# Patient Record
Sex: FEMALE | Race: WHITE | HISPANIC OR LATINO | Employment: OTHER | URBAN - METROPOLITAN AREA
[De-identification: names, ages, dates, MRNs, and addresses within clinical notes are randomized per-mention and may not be internally consistent; named-entity substitution may affect disease eponyms.]

---

## 2023-11-03 DIAGNOSIS — I48.91 ATRIAL FIBRILLATION, UNSPECIFIED TYPE (MULTI): Primary | ICD-10-CM

## 2023-11-03 RX ORDER — METOPROLOL SUCCINATE 100 MG/1
100 TABLET, EXTENDED RELEASE ORAL 2 TIMES DAILY
COMMUNITY
End: 2023-11-03 | Stop reason: SDUPTHER

## 2023-11-03 RX ORDER — APIXABAN 2.5 MG/1
2.5 TABLET, FILM COATED ORAL 2 TIMES DAILY
COMMUNITY
End: 2024-02-13 | Stop reason: SDUPTHER

## 2023-11-03 RX ORDER — FUROSEMIDE 20 MG/1
20 TABLET ORAL DAILY
COMMUNITY

## 2023-11-03 RX ORDER — LOSARTAN POTASSIUM 100 MG/1
100 TABLET ORAL DAILY
COMMUNITY

## 2023-11-03 RX ORDER — LYSINE HCL 500 MG
1 TABLET ORAL DAILY
COMMUNITY

## 2023-11-03 RX ORDER — METOPROLOL SUCCINATE 100 MG/1
100 TABLET, EXTENDED RELEASE ORAL 2 TIMES DAILY
Qty: 180 TABLET | Refills: 3 | Status: SHIPPED | OUTPATIENT
Start: 2023-11-03 | End: 2024-11-02

## 2023-11-03 RX ORDER — AMOXICILLIN 500 MG/1
4 TABLET, FILM COATED ORAL ONCE AS NEEDED
COMMUNITY
Start: 2019-10-22 | End: 2024-05-30 | Stop reason: ENTERED-IN-ERROR

## 2023-11-03 RX ORDER — ERYTHROMYCIN 5 MG/G
OINTMENT OPHTHALMIC 3 TIMES DAILY
COMMUNITY
Start: 2023-01-11 | End: 2024-05-30 | Stop reason: ENTERED-IN-ERROR

## 2023-11-03 RX ORDER — DIGOXIN 125 MCG
125 TABLET ORAL
COMMUNITY

## 2023-11-30 DIAGNOSIS — U07.1 COVID: Primary | ICD-10-CM

## 2023-11-30 RX ORDER — NIRMATRELVIR AND RITONAVIR 300-100 MG
3 KIT ORAL 2 TIMES DAILY
Qty: 30 TABLET | Refills: 0 | Status: CANCELLED | OUTPATIENT
Start: 2023-11-30 | End: 2023-12-05

## 2024-02-13 ENCOUNTER — TELEPHONE (OUTPATIENT)
Dept: ENDOCRINOLOGY | Facility: CLINIC | Age: 89
End: 2024-02-13
Payer: MEDICARE

## 2024-02-13 DIAGNOSIS — I48.91 ATRIAL FIBRILLATION, UNSPECIFIED TYPE (MULTI): ICD-10-CM

## 2024-02-13 DIAGNOSIS — E78.5 HYPERLIPIDEMIA, UNSPECIFIED HYPERLIPIDEMIA TYPE: ICD-10-CM

## 2024-02-13 RX ORDER — SIMVASTATIN 20 MG/1
20 TABLET, FILM COATED ORAL NIGHTLY
COMMUNITY
End: 2024-02-13 | Stop reason: SDUPTHER

## 2024-02-13 RX ORDER — APIXABAN 2.5 MG/1
2.5 TABLET, FILM COATED ORAL 2 TIMES DAILY
Qty: 90 TABLET | Refills: 2 | Status: SHIPPED | OUTPATIENT
Start: 2024-02-13

## 2024-02-13 RX ORDER — SIMVASTATIN 20 MG/1
20 TABLET, FILM COATED ORAL NIGHTLY
Qty: 90 TABLET | Refills: 2 | Status: SHIPPED | OUTPATIENT
Start: 2024-02-13

## 2024-02-13 NOTE — TELEPHONE ENCOUNTER
Patient's son in law called stating that she needs a refill of Eliquis and Simvastatin sent to [facility].

## 2024-03-26 DIAGNOSIS — R35.0 URINARY FREQUENCY: Primary | ICD-10-CM

## 2024-03-26 RX ORDER — OXYBUTYNIN CHLORIDE 5 MG/1
TABLET, EXTENDED RELEASE ORAL
Qty: 180 TABLET | Refills: 3 | Status: SHIPPED | OUTPATIENT
Start: 2024-03-26

## 2024-03-26 RX ORDER — OXYBUTYNIN CHLORIDE 5 MG/1
TABLET, EXTENDED RELEASE ORAL
COMMUNITY
End: 2024-03-26 | Stop reason: SDUPTHER

## 2024-04-12 ENCOUNTER — TELEPHONE (OUTPATIENT)
Dept: ENDOCRINOLOGY | Facility: CLINIC | Age: 89
End: 2024-04-12
Payer: MEDICARE

## 2024-04-12 NOTE — TELEPHONE ENCOUNTER
Son in law called called to get refill of eliquis. Would also like to speak with someone about his mother in law's meds as he has been having problems with getting them refilled.

## 2024-05-30 ENCOUNTER — APPOINTMENT (OUTPATIENT)
Dept: CARDIOLOGY | Facility: HOSPITAL | Age: 89
DRG: 193 | End: 2024-05-30
Payer: MEDICARE

## 2024-05-30 ENCOUNTER — TELEPHONE (OUTPATIENT)
Dept: ENDOCRINOLOGY | Facility: CLINIC | Age: 89
End: 2024-05-30
Payer: MEDICARE

## 2024-05-30 ENCOUNTER — APPOINTMENT (OUTPATIENT)
Dept: RADIOLOGY | Facility: HOSPITAL | Age: 89
DRG: 193 | End: 2024-05-30
Payer: MEDICARE

## 2024-05-30 ENCOUNTER — HOSPITAL ENCOUNTER (INPATIENT)
Facility: HOSPITAL | Age: 89
LOS: 4 days | Discharge: NURSING, REHAB OR LONG-TERM CARE | DRG: 193 | End: 2024-06-03
Attending: EMERGENCY MEDICINE | Admitting: INTERNAL MEDICINE
Payer: MEDICARE

## 2024-05-30 DIAGNOSIS — I48.91 ATRIAL FIBRILLATION WITH RVR (MULTI): ICD-10-CM

## 2024-05-30 DIAGNOSIS — J18.9 PNEUMONIA OF LOWER LOBE DUE TO INFECTIOUS ORGANISM, UNSPECIFIED LATERALITY: Primary | ICD-10-CM

## 2024-05-30 DIAGNOSIS — I50.9 CONGESTIVE HEART FAILURE, UNSPECIFIED HF CHRONICITY, UNSPECIFIED HEART FAILURE TYPE (MULTI): ICD-10-CM

## 2024-05-30 LAB
ALBUMIN SERPL BCP-MCNC: 4.1 G/DL (ref 3.4–5)
ALP SERPL-CCNC: 37 U/L (ref 33–136)
ALT SERPL W P-5'-P-CCNC: 14 U/L (ref 7–45)
ANION GAP BLDV CALCULATED.4IONS-SCNC: 13 MMOL/L (ref 10–25)
ANION GAP SERPL CALC-SCNC: 18 MMOL/L (ref 10–20)
AST SERPL W P-5'-P-CCNC: 56 U/L (ref 9–39)
BASE EXCESS BLDV CALC-SCNC: 2.5 MMOL/L (ref -2–3)
BASOPHILS # BLD AUTO: 0.03 X10*3/UL (ref 0–0.1)
BASOPHILS NFR BLD AUTO: 0.2 %
BILIRUB DIRECT SERPL-MCNC: 0.2 MG/DL (ref 0–0.3)
BILIRUB SERPL-MCNC: 1 MG/DL (ref 0–1.2)
BNP SERPL-MCNC: 826 PG/ML (ref 0–99)
BODY TEMPERATURE: 37 DEGREES CELSIUS
BUN SERPL-MCNC: 38 MG/DL (ref 6–23)
CA-I BLDV-SCNC: 1.02 MMOL/L (ref 1.1–1.33)
CALCIUM SERPL-MCNC: 8.6 MG/DL (ref 8.6–10.3)
CHLORIDE BLDV-SCNC: 97 MMOL/L (ref 98–107)
CHLORIDE SERPL-SCNC: 95 MMOL/L (ref 98–107)
CO2 SERPL-SCNC: 22 MMOL/L (ref 21–32)
CREAT SERPL-MCNC: 1.82 MG/DL (ref 0.5–1.05)
DIGOXIN SERPL-MCNC: 1.79 NG/ML (ref 0.8–?)
EGFRCR SERPLBLD CKD-EPI 2021: 25 ML/MIN/1.73M*2
EOSINOPHIL # BLD AUTO: 0 X10*3/UL (ref 0–0.4)
EOSINOPHIL NFR BLD AUTO: 0 %
ERYTHROCYTE [DISTWIDTH] IN BLOOD BY AUTOMATED COUNT: 13.7 % (ref 11.5–14.5)
GLUCOSE BLDV-MCNC: 126 MG/DL (ref 74–99)
GLUCOSE SERPL-MCNC: 114 MG/DL (ref 74–99)
HCO3 BLDV-SCNC: 26.3 MMOL/L (ref 22–26)
HCT VFR BLD AUTO: 41.1 % (ref 36–46)
HCT VFR BLD EST: 43 % (ref 36–46)
HGB BLD-MCNC: 13.4 G/DL (ref 12–16)
HGB BLDV-MCNC: 14.2 G/DL (ref 12–16)
IMM GRANULOCYTES # BLD AUTO: 0.08 X10*3/UL (ref 0–0.5)
IMM GRANULOCYTES NFR BLD AUTO: 0.6 % (ref 0–0.9)
INHALED O2 CONCENTRATION: 21 %
LACTATE BLDV-SCNC: 2.6 MMOL/L (ref 0.4–2)
LACTATE BLDV-SCNC: 2.8 MMOL/L (ref 0.4–2)
LYMPHOCYTES # BLD AUTO: 0.8 X10*3/UL (ref 0.8–3)
LYMPHOCYTES NFR BLD AUTO: 5.8 %
MCH RBC QN AUTO: 31.4 PG (ref 26–34)
MCHC RBC AUTO-ENTMCNC: 32.6 G/DL (ref 32–36)
MCV RBC AUTO: 96 FL (ref 80–100)
MONOCYTES # BLD AUTO: 0.68 X10*3/UL (ref 0.05–0.8)
MONOCYTES NFR BLD AUTO: 4.9 %
NEUTROPHILS # BLD AUTO: 12.26 X10*3/UL (ref 1.6–5.5)
NEUTROPHILS NFR BLD AUTO: 88.5 %
NRBC BLD-RTO: 0 /100 WBCS (ref 0–0)
OXYHGB MFR BLDV: 14.1 % (ref 45–75)
PCO2 BLDV: 37 MM HG (ref 41–51)
PH BLDV: 7.46 PH (ref 7.33–7.43)
PLATELET # BLD AUTO: 112 X10*3/UL (ref 150–450)
PO2 BLDV: 15 MM HG (ref 35–45)
POTASSIUM BLDV-SCNC: 4.5 MMOL/L (ref 3.5–5.3)
POTASSIUM SERPL-SCNC: 4.3 MMOL/L (ref 3.5–5.3)
PROT SERPL-MCNC: 8.3 G/DL (ref 6.4–8.2)
RBC # BLD AUTO: 4.27 X10*6/UL (ref 4–5.2)
SAO2 % BLDV: 14 % (ref 45–75)
SARS-COV-2 RNA RESP QL NAA+PROBE: NOT DETECTED
SODIUM BLDV-SCNC: 132 MMOL/L (ref 136–145)
SODIUM SERPL-SCNC: 131 MMOL/L (ref 136–145)
WBC # BLD AUTO: 13.9 X10*3/UL (ref 4.4–11.3)

## 2024-05-30 PROCEDURE — 96365 THER/PROPH/DIAG IV INF INIT: CPT | Mod: 59

## 2024-05-30 PROCEDURE — 9420000001 HC RT PATIENT EDUCATION 5 MIN

## 2024-05-30 PROCEDURE — 71045 X-RAY EXAM CHEST 1 VIEW: CPT

## 2024-05-30 PROCEDURE — 82248 BILIRUBIN DIRECT: CPT | Performed by: EMERGENCY MEDICINE

## 2024-05-30 PROCEDURE — 94640 AIRWAY INHALATION TREATMENT: CPT | Mod: MUE

## 2024-05-30 PROCEDURE — 36415 COLL VENOUS BLD VENIPUNCTURE: CPT | Performed by: EMERGENCY MEDICINE

## 2024-05-30 PROCEDURE — 84132 ASSAY OF SERUM POTASSIUM: CPT | Mod: 91 | Performed by: EMERGENCY MEDICINE

## 2024-05-30 PROCEDURE — 71045 X-RAY EXAM CHEST 1 VIEW: CPT | Mod: FOREIGN READ | Performed by: RADIOLOGY

## 2024-05-30 PROCEDURE — 94640 AIRWAY INHALATION TREATMENT: CPT

## 2024-05-30 PROCEDURE — 93005 ELECTROCARDIOGRAM TRACING: CPT

## 2024-05-30 PROCEDURE — 93010 ELECTROCARDIOGRAM REPORT: CPT | Performed by: INTERNAL MEDICINE

## 2024-05-30 PROCEDURE — 2500000002 HC RX 250 W HCPCS SELF ADMINISTERED DRUGS (ALT 637 FOR MEDICARE OP, ALT 636 FOR OP/ED): Performed by: EMERGENCY MEDICINE

## 2024-05-30 PROCEDURE — 87635 SARS-COV-2 COVID-19 AMP PRB: CPT | Performed by: EMERGENCY MEDICINE

## 2024-05-30 PROCEDURE — 96367 TX/PROPH/DG ADDL SEQ IV INF: CPT

## 2024-05-30 PROCEDURE — 87075 CULTR BACTERIA EXCEPT BLOOD: CPT | Mod: 91,AHULAB | Performed by: EMERGENCY MEDICINE

## 2024-05-30 PROCEDURE — 2500000004 HC RX 250 GENERAL PHARMACY W/ HCPCS (ALT 636 FOR OP/ED): Performed by: EMERGENCY MEDICINE

## 2024-05-30 PROCEDURE — 83605 ASSAY OF LACTIC ACID: CPT | Performed by: EMERGENCY MEDICINE

## 2024-05-30 PROCEDURE — 94664 DEMO&/EVAL PT USE INHALER: CPT

## 2024-05-30 PROCEDURE — 2500000001 HC RX 250 WO HCPCS SELF ADMINISTERED DRUGS (ALT 637 FOR MEDICARE OP): Performed by: NURSE PRACTITIONER

## 2024-05-30 PROCEDURE — 1200000002 HC GENERAL ROOM WITH TELEMETRY DAILY

## 2024-05-30 PROCEDURE — 96361 HYDRATE IV INFUSION ADD-ON: CPT

## 2024-05-30 PROCEDURE — 83880 ASSAY OF NATRIURETIC PEPTIDE: CPT | Performed by: EMERGENCY MEDICINE

## 2024-05-30 PROCEDURE — 85025 COMPLETE CBC W/AUTO DIFF WBC: CPT | Performed by: EMERGENCY MEDICINE

## 2024-05-30 PROCEDURE — 99291 CRITICAL CARE FIRST HOUR: CPT | Performed by: EMERGENCY MEDICINE

## 2024-05-30 PROCEDURE — 87040 BLOOD CULTURE FOR BACTERIA: CPT | Mod: 91,AHULAB | Performed by: EMERGENCY MEDICINE

## 2024-05-30 PROCEDURE — 2500000005 HC RX 250 GENERAL PHARMACY W/O HCPCS: Performed by: NURSE PRACTITIONER

## 2024-05-30 PROCEDURE — 99223 1ST HOSP IP/OBS HIGH 75: CPT | Performed by: NURSE PRACTITIONER

## 2024-05-30 PROCEDURE — 80162 ASSAY OF DIGOXIN TOTAL: CPT | Performed by: PHARMACIST

## 2024-05-30 PROCEDURE — 2500000005 HC RX 250 GENERAL PHARMACY W/O HCPCS: Performed by: EMERGENCY MEDICINE

## 2024-05-30 PROCEDURE — 82435 ASSAY OF BLOOD CHLORIDE: CPT | Performed by: EMERGENCY MEDICINE

## 2024-05-30 PROCEDURE — 96375 TX/PRO/DX INJ NEW DRUG ADDON: CPT

## 2024-05-30 PROCEDURE — 2500000002 HC RX 250 W HCPCS SELF ADMINISTERED DRUGS (ALT 637 FOR MEDICARE OP, ALT 636 FOR OP/ED): Performed by: NURSE PRACTITIONER

## 2024-05-30 RX ORDER — OXYBUTYNIN CHLORIDE 5 MG/1
5 TABLET ORAL 2 TIMES DAILY PRN
Status: DISCONTINUED | OUTPATIENT
Start: 2024-05-30 | End: 2024-06-03 | Stop reason: HOSPADM

## 2024-05-30 RX ORDER — METOPROLOL TARTRATE 1 MG/ML
5 INJECTION, SOLUTION INTRAVENOUS ONCE
Status: COMPLETED | OUTPATIENT
Start: 2024-05-30 | End: 2024-05-30

## 2024-05-30 RX ORDER — MIRTAZAPINE 15 MG/1
7.5 TABLET, FILM COATED ORAL NIGHTLY
Status: DISCONTINUED | OUTPATIENT
Start: 2024-05-30 | End: 2024-06-03 | Stop reason: HOSPADM

## 2024-05-30 RX ORDER — IPRATROPIUM BROMIDE AND ALBUTEROL SULFATE 2.5; .5 MG/3ML; MG/3ML
3 SOLUTION RESPIRATORY (INHALATION) EVERY 6 HOURS PRN
Status: DISCONTINUED | OUTPATIENT
Start: 2024-05-30 | End: 2024-05-30

## 2024-05-30 RX ORDER — METOPROLOL SUCCINATE 50 MG/1
100 TABLET, EXTENDED RELEASE ORAL 2 TIMES DAILY
Status: DISCONTINUED | OUTPATIENT
Start: 2024-05-30 | End: 2024-06-02

## 2024-05-30 RX ORDER — POTASSIUM CHLORIDE 20 MEQ/1
20 TABLET, EXTENDED RELEASE ORAL DAILY
COMMUNITY

## 2024-05-30 RX ORDER — PRAMIPEXOLE DIHYDROCHLORIDE 0.25 MG/1
0.12 TABLET ORAL NIGHTLY
Status: DISCONTINUED | OUTPATIENT
Start: 2024-05-30 | End: 2024-06-03 | Stop reason: HOSPADM

## 2024-05-30 RX ORDER — METOPROLOL TARTRATE 1 MG/ML
2.5 INJECTION, SOLUTION INTRAVENOUS ONCE
Status: COMPLETED | OUTPATIENT
Start: 2024-05-30 | End: 2024-05-30

## 2024-05-30 RX ORDER — FUROSEMIDE 20 MG/1
20 TABLET ORAL DAILY
Status: DISCONTINUED | OUTPATIENT
Start: 2024-05-30 | End: 2024-06-03 | Stop reason: HOSPADM

## 2024-05-30 RX ORDER — IPRATROPIUM BROMIDE AND ALBUTEROL SULFATE 2.5; .5 MG/3ML; MG/3ML
3 SOLUTION RESPIRATORY (INHALATION) ONCE
Status: COMPLETED | OUTPATIENT
Start: 2024-05-30 | End: 2024-05-30

## 2024-05-30 RX ORDER — PRAMIPEXOLE DIHYDROCHLORIDE 0.12 MG/1
0.12 TABLET ORAL NIGHTLY
COMMUNITY

## 2024-05-30 RX ORDER — LOSARTAN POTASSIUM 50 MG/1
100 TABLET ORAL DAILY
Status: DISCONTINUED | OUTPATIENT
Start: 2024-05-30 | End: 2024-06-03 | Stop reason: HOSPADM

## 2024-05-30 RX ORDER — SIMVASTATIN 20 MG/1
20 TABLET, FILM COATED ORAL NIGHTLY
Status: DISCONTINUED | OUTPATIENT
Start: 2024-05-30 | End: 2024-06-02

## 2024-05-30 RX ORDER — ONDANSETRON HYDROCHLORIDE 2 MG/ML
4 INJECTION, SOLUTION INTRAVENOUS EVERY 6 HOURS PRN
Status: DISCONTINUED | OUTPATIENT
Start: 2024-05-30 | End: 2024-06-03 | Stop reason: HOSPADM

## 2024-05-30 RX ORDER — MIRTAZAPINE 7.5 MG/1
7.5 TABLET, FILM COATED ORAL NIGHTLY
COMMUNITY

## 2024-05-30 RX ORDER — IPRATROPIUM BROMIDE AND ALBUTEROL SULFATE 2.5; .5 MG/3ML; MG/3ML
3 SOLUTION RESPIRATORY (INHALATION) EVERY 2 HOUR PRN
Status: DISCONTINUED | OUTPATIENT
Start: 2024-05-30 | End: 2024-06-03 | Stop reason: HOSPADM

## 2024-05-30 RX ORDER — ACETAMINOPHEN 325 MG/1
650 TABLET ORAL EVERY 4 HOURS PRN
Status: DISCONTINUED | OUTPATIENT
Start: 2024-05-30 | End: 2024-06-03 | Stop reason: HOSPADM

## 2024-05-30 RX ORDER — DIGOXIN 125 MCG
125 TABLET ORAL
Status: DISCONTINUED | OUTPATIENT
Start: 2024-05-31 | End: 2024-06-03 | Stop reason: HOSPADM

## 2024-05-30 RX ORDER — IPRATROPIUM BROMIDE AND ALBUTEROL SULFATE 2.5; .5 MG/3ML; MG/3ML
3 SOLUTION RESPIRATORY (INHALATION)
Status: DISCONTINUED | OUTPATIENT
Start: 2024-05-30 | End: 2024-06-03 | Stop reason: HOSPADM

## 2024-05-30 RX ADMIN — METOPROLOL TARTRATE 2.5 MG: 5 INJECTION INTRAVENOUS at 23:08

## 2024-05-30 RX ADMIN — PRAMIPEXOLE DIHYDROCHLORIDE 0.12 MG: 0.25 TABLET ORAL at 20:43

## 2024-05-30 RX ADMIN — CEFTRIAXONE 2 G: 2 INJECTION, POWDER, FOR SOLUTION INTRAMUSCULAR; INTRAVENOUS at 14:16

## 2024-05-30 RX ADMIN — SODIUM CHLORIDE, POTASSIUM CHLORIDE, SODIUM LACTATE AND CALCIUM CHLORIDE 500 ML: 600; 310; 30; 20 INJECTION, SOLUTION INTRAVENOUS at 13:19

## 2024-05-30 RX ADMIN — MIRTAZAPINE 7.5 MG: 15 TABLET, FILM COATED ORAL at 20:43

## 2024-05-30 RX ADMIN — APIXABAN 2.5 MG: 2.5 TABLET, FILM COATED ORAL at 20:43

## 2024-05-30 RX ADMIN — IPRATROPIUM BROMIDE AND ALBUTEROL SULFATE 3 ML: 2.5; .5 SOLUTION RESPIRATORY (INHALATION) at 20:56

## 2024-05-30 RX ADMIN — AZITHROMYCIN MONOHYDRATE 500 MG: 500 INJECTION, POWDER, LYOPHILIZED, FOR SOLUTION INTRAVENOUS at 14:52

## 2024-05-30 RX ADMIN — METOPROLOL SUCCINATE 100 MG: 50 TABLET, EXTENDED RELEASE ORAL at 20:42

## 2024-05-30 RX ADMIN — SIMVASTATIN 20 MG: 20 TABLET, FILM COATED ORAL at 20:43

## 2024-05-30 RX ADMIN — METOPROLOL TARTRATE 5 MG: 5 INJECTION INTRAVENOUS at 14:16

## 2024-05-30 RX ADMIN — IPRATROPIUM BROMIDE AND ALBUTEROL SULFATE 3 ML: 2.5; .5 SOLUTION RESPIRATORY (INHALATION) at 13:21

## 2024-05-30 SDOH — SOCIAL STABILITY: SOCIAL INSECURITY: DO YOU FEEL ANYONE HAS EXPLOITED OR TAKEN ADVANTAGE OF YOU FINANCIALLY OR OF YOUR PERSONAL PROPERTY?: NO

## 2024-05-30 SDOH — SOCIAL STABILITY: SOCIAL INSECURITY: DO YOU FEEL UNSAFE GOING BACK TO THE PLACE WHERE YOU ARE LIVING?: NO

## 2024-05-30 SDOH — ECONOMIC STABILITY: INCOME INSECURITY: HOW HARD IS IT FOR YOU TO PAY FOR THE VERY BASICS LIKE FOOD, HOUSING, MEDICAL CARE, AND HEATING?: PATIENT DECLINED

## 2024-05-30 SDOH — SOCIAL STABILITY: SOCIAL INSECURITY: ABUSE: ADULT

## 2024-05-30 SDOH — SOCIAL STABILITY: SOCIAL INSECURITY: ARE THERE ANY APPARENT SIGNS OF INJURIES/BEHAVIORS THAT COULD BE RELATED TO ABUSE/NEGLECT?: NO

## 2024-05-30 SDOH — SOCIAL STABILITY: SOCIAL INSECURITY: HAS ANYONE EVER THREATENED TO HURT YOUR FAMILY OR YOUR PETS?: NO

## 2024-05-30 SDOH — SOCIAL STABILITY: SOCIAL INSECURITY: HAVE YOU HAD ANY THOUGHTS OF HARMING ANYONE ELSE?: NO

## 2024-05-30 SDOH — SOCIAL STABILITY: SOCIAL INSECURITY: HAVE YOU HAD THOUGHTS OF HARMING ANYONE ELSE?: NO

## 2024-05-30 SDOH — SOCIAL STABILITY: SOCIAL INSECURITY: WERE YOU ABLE TO COMPLETE ALL THE BEHAVIORAL HEALTH SCREENINGS?: YES

## 2024-05-30 SDOH — SOCIAL STABILITY: SOCIAL INSECURITY: DOES ANYONE TRY TO KEEP YOU FROM HAVING/CONTACTING OTHER FRIENDS OR DOING THINGS OUTSIDE YOUR HOME?: NO

## 2024-05-30 SDOH — SOCIAL STABILITY: SOCIAL INSECURITY: ARE YOU OR HAVE YOU BEEN THREATENED OR ABUSED PHYSICALLY, EMOTIONALLY, OR SEXUALLY BY ANYONE?: NO

## 2024-05-30 ASSESSMENT — ACTIVITIES OF DAILY LIVING (ADL)
GROOMING: DEPENDENT
BATHING: DEPENDENT
DRESSING YOURSELF: DEPENDENT
ADEQUATE_TO_COMPLETE_ADL: YES
WALKS IN HOME: DEPENDENT
FEEDING YOURSELF: DEPENDENT
JUDGMENT_ADEQUATE_SAFELY_COMPLETE_DAILY_ACTIVITIES: YES
ASSISTIVE_DEVICE: WALKER
HEARING - RIGHT EAR: DIFFICULTY WITH NOISE
PATIENT'S MEMORY ADEQUATE TO SAFELY COMPLETE DAILY ACTIVITIES?: NO
TOILETING: DEPENDENT
HEARING - LEFT EAR: DIFFICULTY WITH NOISE
LACK_OF_TRANSPORTATION: PATIENT DECLINED

## 2024-05-30 ASSESSMENT — COGNITIVE AND FUNCTIONAL STATUS - GENERAL
WALKING IN HOSPITAL ROOM: A LOT
EATING MEALS: A LITTLE
DRESSING REGULAR UPPER BODY CLOTHING: A LOT
PATIENT BASELINE BEDBOUND: NO
WALKING IN HOSPITAL ROOM: A LOT
CLIMB 3 TO 5 STEPS WITH RAILING: TOTAL
TOILETING: A LOT
HELP NEEDED FOR BATHING: A LOT
MOVING TO AND FROM BED TO CHAIR: A LOT
MOVING TO AND FROM BED TO CHAIR: A LOT
MOBILITY SCORE: 11
DRESSING REGULAR UPPER BODY CLOTHING: A LOT
DRESSING REGULAR LOWER BODY CLOTHING: A LOT
DAILY ACTIVITIY SCORE: 13
TURNING FROM BACK TO SIDE WHILE IN FLAT BAD: A LOT
HELP NEEDED FOR BATHING: A LOT
TOILETING: A LOT
DRESSING REGULAR LOWER BODY CLOTHING: A LOT
MOBILITY SCORE: 11
DAILY ACTIVITIY SCORE: 13
PERSONAL GROOMING: A LOT
CLIMB 3 TO 5 STEPS WITH RAILING: TOTAL
PERSONAL GROOMING: A LOT
STANDING UP FROM CHAIR USING ARMS: A LOT
MOVING FROM LYING ON BACK TO SITTING ON SIDE OF FLAT BED WITH BEDRAILS: A LOT
TURNING FROM BACK TO SIDE WHILE IN FLAT BAD: A LOT
STANDING UP FROM CHAIR USING ARMS: A LOT
MOVING FROM LYING ON BACK TO SITTING ON SIDE OF FLAT BED WITH BEDRAILS: A LOT
EATING MEALS: A LITTLE

## 2024-05-30 ASSESSMENT — LIFESTYLE VARIABLES
HOW OFTEN DO YOU HAVE A DRINK CONTAINING ALCOHOL: NEVER
AUDIT-C TOTAL SCORE: 0
HOW OFTEN DO YOU HAVE 6 OR MORE DRINKS ON ONE OCCASION: NEVER
SKIP TO QUESTIONS 9-10: 1
AUDIT-C TOTAL SCORE: 0
HOW MANY STANDARD DRINKS CONTAINING ALCOHOL DO YOU HAVE ON A TYPICAL DAY: PATIENT DOES NOT DRINK

## 2024-05-30 ASSESSMENT — ENCOUNTER SYMPTOMS
DIARRHEA: 1
RESPIRATORY NEGATIVE: 1
NEUROLOGICAL NEGATIVE: 1
PSYCHIATRIC NEGATIVE: 1
CARDIOVASCULAR NEGATIVE: 1
APPETITE CHANGE: 1
MUSCULOSKELETAL NEGATIVE: 1

## 2024-05-30 ASSESSMENT — PAIN - FUNCTIONAL ASSESSMENT
PAIN_FUNCTIONAL_ASSESSMENT: 0-10
PAIN_FUNCTIONAL_ASSESSMENT: 0-10

## 2024-05-30 ASSESSMENT — COLUMBIA-SUICIDE SEVERITY RATING SCALE - C-SSRS
1. IN THE PAST MONTH, HAVE YOU WISHED YOU WERE DEAD OR WISHED YOU COULD GO TO SLEEP AND NOT WAKE UP?: NO
2. HAVE YOU ACTUALLY HAD ANY THOUGHTS OF KILLING YOURSELF?: NO
6. HAVE YOU EVER DONE ANYTHING, STARTED TO DO ANYTHING, OR PREPARED TO DO ANYTHING TO END YOUR LIFE?: NO

## 2024-05-30 ASSESSMENT — PATIENT HEALTH QUESTIONNAIRE - PHQ9
SUM OF ALL RESPONSES TO PHQ9 QUESTIONS 1 & 2: 0
2. FEELING DOWN, DEPRESSED OR HOPELESS: NOT AT ALL
1. LITTLE INTEREST OR PLEASURE IN DOING THINGS: NOT AT ALL

## 2024-05-30 ASSESSMENT — PAIN SCALES - GENERAL
PAINLEVEL_OUTOF10: 0 - NO PAIN
PAINLEVEL_OUTOF10: 0 - NO PAIN

## 2024-05-30 NOTE — TELEPHONE ENCOUNTER
Son in law called concerned due to his mother in law having a bad cough for a couple of days. She also has had some diarrhea and recently her cough sounds like she has mucus or fluid in her lungs. Son in law would like to know what he should do, not sure if he should take her to urgent care or if Dr. Lewis can prescribe some antibiotics.

## 2024-05-30 NOTE — PROGRESS NOTES
Pharmacy Medication History Review     Spoke to the patients son in law, (the one who does all her meds) patient takes Metoprolol twice a day, and takes Eliquis daily.  Takes Digoxin she only takes 5 days a week.  Took morning meds.    Constance Apodaca is a 89 y.o. female admitted for Pneumonia of lower lobe due to infectious organism, unspecified laterality. Pharmacy reviewed the patient's [id number] medications and allergies for accuracy.    The list below reflectives the updated PTA list. Please review each medication in order reconciliation for additional clarification and justification.  Prior to Admission Medications   Prescriptions Last Dose Informant   Eliquis 2.5 mg tablet 5/30/2024 at AM    Sig: Take 1 tablet (2.5 mg) by mouth 2 times a day.   calcium carbonate-vit D3-min 600 mg calcium- 400 unit tablet 5/29/2024    Sig: Take 1 tablet by mouth once daily.   digoxin (Lanoxin) 125 MCG tablet 5/30/2024 at AM    Sig: Take 1 tablet (125 mcg) by mouth 5 times a week.  TAKE 1 TABLET BY MOUTH EVERY MONDAY, TUESDAY, THURSDAY, FRIDAY, AND SATURDAY   furosemide (Lasix) 20 mg tablet 5/30/2024 at AM    Sig: Take 1 tablet (20 mg) by mouth once daily. TAKE 1 TABLET BY MOUTH DAILY   losartan (Cozaar) 100 mg tablet 5/30/2024 at am    Sig: Take 1 tablet (100 mg) by mouth once daily.  TAKE 1 TABLET BY MOUTH DAILY   metoprolol succinate XL (Toprol-XL) 100 mg 24 hr tablet 5/30/2024 at am    Sig: Take 1 tablet (100 mg) by mouth 2 times a day. TAKE 1 TABLET BY MOUTH TWICE DAILY   mirtazapine (Remeron) 7.5 mg tablet 5/28/2024 at pm    Sig: Take 1 tablet (7.5 mg) by mouth once daily at bedtime.   oxybutynin XL (Ditropan-XL) 5 mg 24 hr tablet 5/30/2024 at am    Sig: TAKE 1 TABLET IN THE MORNING AND 1 IN THE EVENING as needed for urinary frequency.  Causes dry mouth   potassium chloride CR 20 mEq ER tablet 5/30/2024 at am    Sig: Take 1 tablet (20 mEq) by mouth once daily. Do not crush or chew.   pramipexole (Mirapex) 0.125 mg  tablet 5/28/2024    Sig: Take 1 tablet (0.125 mg) by mouth once daily at bedtime. Restless legs   simvastatin (Zocor) 20 mg tablet 5/28/2024 at pm    Sig: Take 1 tablet (20 mg) by mouth once daily at bedtime.      Facility-Administered Medications: None       The list below reflectives the updated allergy list. Please review each documented allergy for additional clarification and justification.  Allergies  Reviewed by Mckenna Izquierdo RN on 5/30/2024   No Known Allergies         Below are additional concerns with the patient's PTA list.      Tahira Gracia

## 2024-05-30 NOTE — ED PROVIDER NOTES
HPI   Chief Complaint   Patient presents with    Cough    Diarrhea       HPI: []  89-year-old white female history of hypertension, dyslipidemia, CHF, A-fib on Eliquis mild dementia comes in with increasing cough and shortness of breath.  Son states that they just came back from [place] they developed URI, and in the mom called the continued URI over the last few days.  Over the last few days she has had increasing cough and shortness of breath had some diarrhea earlier which is since resolved.  Over the last 24 hours she had increasing shortness of breath and cough.  Cough nonproductive.  No fever no chills.  No hematemesis melena hematochezia no hemoptysis no recent travel hospitalization or antibiotic use.  No trauma no falls.  Has generalized weakness.  And fatigue.    Past history: Hypertension, CHF, A-fib, dementia  Social: Patient denies current tobacco alcohol drug abuse.  REVIEW OF SYSTEMS:    GENERAL.: No weight loss, positive for fatigue, anorexia, insomnia, fever.    EYES: No vision loss, double vision, drainage, eye pain.    ENT: No pharyngitis, dry mouth.    CARDIOPULMONARY: No chest pain, palpitations, syncope, near syncope.  Positive for shortness of breath, positive for cough, hemoptysis.    GI: No abdominal pain, change in bowel habits, melena, hematemesis, hematochezia, nausea, vomiting, diarrhea.    : No discharge, dysuria, frequency, urgency, hematuria.    MS: No limb pain, joint pain, joint swelling.    SKIN: No rashes.    PSYCH: No depression, anxiety, suicidality, homicidality.    Review of systems is otherwise negative unless stated above or in history of present illness.  Social history, family history, allergies reviewed.  PHYSICAL EXAM:    GENERAL: Vitals noted, no distress. Alert and oriented  x 1-2 at baseline very frail. Non-toxic.      EENT: TMs clear. Posterior oropharynx unremarkable. No meningismus. No LAD.     NECK: Supple. Nontender. No midline tenderness.     CARDIAC:  Last OV  9/18/2020  Future OV  None scheduled Tachycardic with an irregularly irregular rate and rhythm, 2 x 6 ejection systolic murmur best heard at the left sternal border, no rubs or gallops. No JVD    PULMONARY: Coarse bibasilar crackles with some audible rhonchi. No wheezes  No respiratory distress.  No tachypnea stridor or retractions    ABDOMEN: Soft, nonsurgical. Nontender. No peritoneal signs. Normoactive bowel sounds. No pulsatile masses.     EXTREMITIES: No peripheral edema. Negative Homans bilaterally, no cords.  2+ bounding pulses well-perfused.    SKIN: No rash. Intact.     NEURO: No focal neurologic deficits, NIH score of 0. Cranial nerves normal as tested from II through XII.     MEDICAL DECISION MAKING:  EKG upon arrival on my interpretation shows sinus tachycardia normal axis rate 110 with no acute ischemic change.  Repeat EKG on my interpretation shows atrial fibrillation normal axis rate in the 150s with nonspecific ST-T wave changes.  CBC with shows leukocytosis of 14,000 with a left shift, chemistries unremarkable LFTs unremarkable  elevated chest x-ray shows evolving pneumonia, COVID is negative    Treatment in ED: IV established pancultured given IV Rocephin and azithromycin IV fluids and IV metoprolol 1 dose 5 mg    ED course: Repeat assessment feeling much better workup wheezing improved heart rate down to close to 100    Impression: #1 community-acquired pneumonia, #2 atrial fibrillation with RVR    Plans and MDM: 89-year-old female history of A-fib on Eliquis presents with a cough congestion and shortness of breath workup is concerning for evolving pneumonia perhaps mild decompensated CHF with A-fib RVR rate well-controlled with 1 dose of IV metoprolol patient already on Eliquis no concern for septic shock pulmonary edema STEMI NSTEMI ACS or dissection, patient will be hospitalized for further care.                          [place] Coma Scale Score: 15                     Patient History   Past Medical History:   Diagnosis  Date    Atherosclerotic heart disease of native coronary artery without angina pectoris 10/12/2017    Coronary sclerosis    Chronic systolic (congestive) heart failure (Multi) 04/18/2022    Chronic systolic heart failure, ACC/AHA stage C    Disorder of lacrimal system, unspecified 11/08/2018    Defect of lacrimal duct    Encounter for immunization 10/08/2020    Influenza vaccine administered    Fracture of unspecified part of neck of unspecified femur, initial encounter for closed fracture (Multi) 02/18/2021    Femoral neck fracture    Hypokalemia 08/01/2018    Low serum potassium    Other cardiomyopathies (Multi) 02/14/2022    NICM (nonischemic cardiomyopathy)    Other conditions influencing health status     Osteoarthritis    Other injury of unspecified body region, initial encounter 02/14/2020    Contusion of bone    Other specified abnormal findings of blood chemistry 08/12/2014    Azotemia    Other specified disorders of eye and adnexa 11/08/2018    Red eye    Other specified health status 09/28/2017    Influenza vaccination ordered    Pain in right hip 02/14/2020    Hip pain, right    Pain in unspecified foot 02/14/2014    Foot pain    Pain in unspecified hip 03/22/2018    Joint pain, hip    Pain in unspecified knee     Knee pain    Personal history of diseases of the skin and subcutaneous tissue 02/14/2014    History of ingrown nail    Personal history of other diseases of the circulatory system 07/09/2018    History of atrial fibrillation    Personal history of other diseases of the circulatory system 04/19/2018    History of congestive heart failure    Personal history of other diseases of the circulatory system 03/22/2018    History of sinus bradycardia    Personal history of other diseases of the circulatory system     History of hypertension    Personal history of other diseases of the circulatory system     History of peripheral vascular disease    Personal history of other diseases of the digestive  system 05/23/2019    History of small bowel obstruction    Personal history of other diseases of the musculoskeletal system and connective tissue 06/29/2015    History of backache    Personal history of other diseases of the nervous system and sense organs 11/08/2018    History of conjunctivitis    Personal history of other diseases of the nervous system and sense organs 06/16/2016    History of hearing loss    Personal history of other diseases of the nervous system and sense organs 11/17/2016    History of tinnitus    Personal history of other diseases of the nervous system and sense organs 12/29/2017    History of redness of eye    Personal history of other endocrine, nutritional and metabolic disease 04/14/2016    History of hypokalemia    Personal history of other endocrine, nutritional and metabolic disease     History of hyperlipidemia    Personal history of other infectious and parasitic diseases     History of candidal vulvovaginitis    Personal history of other mental and behavioral disorders 08/16/2018    History of depression    Personal history of other specified conditions 09/13/2018    History of fatigue    Personal history of other specified conditions 07/10/2015    History of urinary frequency    Personal history of other specified conditions 03/13/2019    History of abdominal pain    Personal history of other specified conditions 03/15/2020    History of dyspnea    Personal history of other specified conditions 04/25/2022    History of urinary urgency    Personal history of pneumonia (recurrent)     History of pneumonia    Personal history of urinary (tract) infections 11/12/2021    History of urinary tract infection    Personal history of urinary (tract) infections 07/02/2020    History of urinary tract infection    Pneumonitis due to inhalation of food and vomit (Multi)     Aspiration pneumonia of right lower lobe due to regurgitated food    Presence of unspecified artificial hip joint 10/22/2019     Status post hip replacement    Presence of unspecified artificial hip joint 03/22/2018    Status post hip replacement    Rash and other nonspecific skin eruption 11/05/2013    Rash    Retention of urine, unspecified 07/17/2015    Incomplete bladder emptying    Unilateral primary osteoarthritis, right knee     Osteoarthritis of right knee    Unspecified fall, initial encounter 02/15/2018    Fall, accidental     Past Surgical History:   Procedure Laterality Date    OTHER SURGICAL HISTORY  06/07/2013    Bypass Graft Using Vein: Femoral-popliteal    TOTAL ABDOMINAL HYSTERECTOMY W/ BILATERAL SALPINGOOPHORECTOMY  07/10/2015    Total Abdominal Hysterectomy With Removal Of Both Ovaries     No family history on file.  Social History     Tobacco Use    Smoking status: Not on file    Smokeless tobacco: Not on file   Substance Use Topics    Alcohol use: Not on file    Drug use: Not on file       Physical Exam   ED Triage Vitals [05/30/24 1139]   Temperature Heart Rate Respirations BP   36.8 °C (98.2 °F) 100 20 118/50      Pulse Ox Temp Source Heart Rate Source Patient Position   (!) 93 % Temporal Monitor Sitting      BP Location FiO2 (%)     Left arm --       Physical Exam    ED Course & MDM   ED Course as of 05/30/24 1724   Thu May 30, 2024   1723 Chest ray shows evolving pneumonia, CBC with differential shows leukocytosis 13,000, chemistry shows stable CKD, BNP about 800 elevated, patient was pancultured and was given intravenous Rocephin and azithromycin IV metoprolol 1 dose for her A-fib and patient was hospitalized for further care. [MT]      ED Course User Index  [MT] Merry Parker MD         Diagnoses as of 05/30/24 1724   Pneumonia of lower lobe due to infectious organism, unspecified laterality   Congestive heart failure, unspecified HF chronicity, unspecified heart failure type (Multi)   Atrial fibrillation with RVR (Multi)       Medical Decision Making      Procedure  Critical Care    Performed by: Merry SMITH  MD Keith  Authorized by: Merry Parker MD    Critical care provider statement:     Critical care time (minutes):  55    Critical care time was exclusive of:  Separately billable procedures and treating other patients    Critical care was necessary to treat or prevent imminent or life-threatening deterioration of the following conditions:  Respiratory failure    Critical care was time spent personally by me on the following activities:  Blood draw for specimens, development of treatment plan with patient or surrogate, evaluation of patient's response to treatment, examination of patient, discussions with primary provider, ordering and performing treatments and interventions, ordering and review of laboratory studies, ordering and review of radiographic studies, pulse oximetry and re-evaluation of patient's condition    Care discussed with: admitting provider         Merry Parker MD  05/30/24 0361

## 2024-05-30 NOTE — H&P
History Of Present Illness  Constance Apodaca is a 89 y.o. female with a past medical history of permanent atrial fibrillation on apixaban, chronic systolic CHF left ventricular ejection fraction of 20%, hypertension, hyperlipidemia, osteoporosis, GERD, urinary retention and DVT presenting with cough and diarrhea.  Per patient's son who is primarily is doing states that patient has had a cough since Saturday.  He states that they recently traveled to visit family, and those family members also had a cough.  Patient states that his wife also developed a cough as well.  Patient reports that his mom has been generally weak over the last few days has had a poor appetite, and she also has had diarrhea for 2 to 3 days.  He denies any recent treatment with antibiotics,, and states that his mom has been doing well for the last couple years until now.  Patient cough is moist and productive.  She is required 3 L nasal cannula.  White blood cell count was noted to be 13.9, sodium 131, potassium 4.3, BUN 38, creatinine 1.82, .  Chest x-ray showed bilateral peribronchial thickening/bronchitis.  No fever or chills noted.  Patient was tachycardic In the 140s EKG showed atrial fibrillation which patient has a history of.  Patient was given 5 mg of IV metoprolol with improvement in her heart rate.  Patient denies chest pain, shortness of breath, abdominal pain, nausea, vomiting.  Patient was given Rocephin and azithromycin IV along with 500 mL bolus of lactated Ringer's as she has a history of CHF.  Patient be admitted for further workup.     Past Medical History  She has a past medical history of Atherosclerotic heart disease of native coronary artery without angina pectoris (10/12/2017), Chronic systolic (congestive) heart failure (Multi) (04/18/2022), Disorder of lacrimal system, unspecified (11/08/2018), Encounter for immunization (10/08/2020), Fracture of unspecified part of neck of unspecified femur, initial encounter for  closed fracture (Multi) (02/18/2021), Hypokalemia (08/01/2018), Other cardiomyopathies (Multi) (02/14/2022), Other conditions influencing health status, Other injury of unspecified body region, initial encounter (02/14/2020), Other specified abnormal findings of blood chemistry (08/12/2014), Other specified disorders of eye and adnexa (11/08/2018), Other specified health status (09/28/2017), Pain in right hip (02/14/2020), Pain in unspecified foot (02/14/2014), Pain in unspecified hip (03/22/2018), Pain in unspecified knee, Personal history of diseases of the skin and subcutaneous tissue (02/14/2014), Personal history of other diseases of the circulatory system (07/09/2018), Personal history of other diseases of the circulatory system (04/19/2018), Personal history of other diseases of the circulatory system (03/22/2018), Personal history of other diseases of the circulatory system, Personal history of other diseases of the circulatory system, Personal history of other diseases of the digestive system (05/23/2019), Personal history of other diseases of the musculoskeletal system and connective tissue (06/29/2015), Personal history of other diseases of the nervous system and sense organs (11/08/2018), Personal history of other diseases of the nervous system and sense organs (06/16/2016), Personal history of other diseases of the nervous system and sense organs (11/17/2016), Personal history of other diseases of the nervous system and sense organs (12/29/2017), Personal history of other endocrine, nutritional and metabolic disease (04/14/2016), Personal history of other endocrine, nutritional and metabolic disease, Personal history of other infectious and parasitic diseases, Personal history of other mental and behavioral disorders (08/16/2018), Personal history of other specified conditions (09/13/2018), Personal history of other specified conditions (07/10/2015), Personal history of other specified conditions  (03/13/2019), Personal history of other specified conditions (03/15/2020), Personal history of other specified conditions (04/25/2022), Personal history of pneumonia (recurrent), Personal history of urinary (tract) infections (11/12/2021), Personal history of urinary (tract) infections (07/02/2020), Pneumonitis due to inhalation of food and vomit (Multi), Presence of unspecified artificial hip joint (10/22/2019), Presence of unspecified artificial hip joint (03/22/2018), Rash and other nonspecific skin eruption (11/05/2013), Retention of urine, unspecified (07/17/2015), Unilateral primary osteoarthritis, right knee, and Unspecified fall, initial encounter (02/15/2018).    Surgical History  She has a past surgical history that includes Other surgical history (06/07/2013) and Total abdominal hysterectomy w/ bilateral salpingoophorectomy (07/10/2015).     Social History  She has no history on file for tobacco use, alcohol use, and drug use.    Family History  No family history on file.     Allergies  Patient has no known allergies.    Review of Systems   Constitutional:  Positive for appetite change.   HENT: Negative.     Respiratory: Negative.     Cardiovascular: Negative.    Gastrointestinal:  Positive for diarrhea.   Genitourinary: Negative.    Musculoskeletal: Negative.    Neurological: Negative.    Psychiatric/Behavioral: Negative.          Physical Exam  HENT:      Head: Normocephalic.   Cardiovascular:      Rate and Rhythm: Normal rate. Rhythm irregular.   Pulmonary:      Breath sounds: Examination of the right-upper field reveals rales. Examination of the left-upper field reveals rales. Examination of the right-middle field reveals rales. Examination of the left-middle field reveals rales. Examination of the right-lower field reveals decreased breath sounds. Examination of the left-lower field reveals decreased breath sounds. Decreased breath sounds and rales present.   Abdominal:      General: Bowel sounds are  normal.      Palpations: Abdomen is soft.   Musculoskeletal:      Cervical back: Neck supple.   Skin:     General: Skin is warm.   Neurological:      Mental Status: She is alert. Mental status is at baseline.   Psychiatric:         Mood and Affect: Mood normal.         Behavior: Behavior normal.          Last Recorded Vitals  /56   Pulse (!) 111   Temp 36.8 °C (98.2 °F) (Temporal)   Resp 19   Wt 45.4 kg (100 lb)   SpO2 95%     Relevant Results      Results for orders placed or performed during the hospital encounter of 05/30/24 (from the past 24 hour(s))   ECG 12 lead   Result Value Ref Range    Ventricular Rate 121 BPM    QRS Duration 116 ms    QT Interval 346 ms    QTC Calculation(Bazett) 491 ms    R Axis 58 degrees    T Axis -65 degrees    QRS Count 19 beats    Q Onset 227 ms    T Offset 400 ms    QTC Fredericia 437 ms   CBC and Auto Differential   Result Value Ref Range    WBC 13.9 (H) 4.4 - 11.3 x10*3/uL    nRBC 0.0 0.0 - 0.0 /100 WBCs    RBC 4.27 4.00 - 5.20 x10*6/uL    Hemoglobin 13.4 12.0 - 16.0 g/dL    Hematocrit 41.1 36.0 - 46.0 %    MCV 96 80 - 100 fL    MCH 31.4 26.0 - 34.0 pg    MCHC 32.6 32.0 - 36.0 g/dL    RDW 13.7 11.5 - 14.5 %    Platelets 112 (L) 150 - 450 x10*3/uL    Neutrophils % 88.5 40.0 - 80.0 %    Immature Granulocytes %, Automated 0.6 0.0 - 0.9 %    Lymphocytes % 5.8 13.0 - 44.0 %    Monocytes % 4.9 2.0 - 10.0 %    Eosinophils % 0.0 0.0 - 6.0 %    Basophils % 0.2 0.0 - 2.0 %    Neutrophils Absolute 12.26 (H) 1.60 - 5.50 x10*3/uL    Immature Granulocytes Absolute, Automated 0.08 0.00 - 0.50 x10*3/uL    Lymphocytes Absolute 0.80 0.80 - 3.00 x10*3/uL    Monocytes Absolute 0.68 0.05 - 0.80 x10*3/uL    Eosinophils Absolute 0.00 0.00 - 0.40 x10*3/uL    Basophils Absolute 0.03 0.00 - 0.10 x10*3/uL   Basic metabolic panel   Result Value Ref Range    Glucose 114 (H) 74 - 99 mg/dL    Sodium 131 (L) 136 - 145 mmol/L    Potassium 4.3 3.5 - 5.3 mmol/L    Chloride 95 (L) 98 - 107 mmol/L     Bicarbonate 22 21 - 32 mmol/L    Anion Gap 18 10 - 20 mmol/L    Urea Nitrogen 38 (H) 6 - 23 mg/dL    Creatinine 1.82 (H) 0.50 - 1.05 mg/dL    eGFR 25 (L) >60 mL/min/1.73m*2    Calcium 8.6 8.6 - 10.3 mg/dL   Hepatic function panel   Result Value Ref Range    Albumin 4.1 3.4 - 5.0 g/dL    Bilirubin, Total 1.0 0.0 - 1.2 mg/dL    Bilirubin, Direct 0.2 0.0 - 0.3 mg/dL    Alkaline Phosphatase 37 33 - 136 U/L    ALT 14 7 - 45 U/L    AST 56 (H) 9 - 39 U/L    Total Protein 8.3 (H) 6.4 - 8.2 g/dL   B-Type Natriuretic Peptide   Result Value Ref Range     (H) 0 - 99 pg/mL   Blood Gas Venous Full Panel   Result Value Ref Range    POCT pH, Venous 7.46 (H) 7.33 - 7.43 pH    POCT pCO2, Venous 37 (L) 41 - 51 mm Hg    POCT pO2, Venous 15 (L) 35 - 45 mm Hg    POCT SO2, Venous 14 (L) 45 - 75 %    POCT Oxy Hemoglobin, Venous 14.1 (L) 45.0 - 75.0 %    POCT Hematocrit Calculated, Venous 43.0 36.0 - 46.0 %    POCT Sodium, Venous 132 (L) 136 - 145 mmol/L    POCT Potassium, Venous 4.5 3.5 - 5.3 mmol/L    POCT Chloride, Venous 97 (L) 98 - 107 mmol/L    POCT Ionized Calicum, Venous 1.02 (L) 1.10 - 1.33 mmol/L    POCT Glucose, Venous 126 (H) 74 - 99 mg/dL    POCT Lactate, Venous 2.8 (H) 0.4 - 2.0 mmol/L    POCT Base Excess, Venous 2.5 -2.0 - 3.0 mmol/L    POCT HCO3 Calculated, Venous 26.3 (H) 22.0 - 26.0 mmol/L    POCT Hemoglobin, Venous 14.2 12.0 - 16.0 g/dL    POCT Anion Gap, Venous 13.0 10.0 - 25.0 mmol/L    Patient Temperature 37.0 degrees Celsius    FiO2 21 %   Sars-CoV-2 PCR   Result Value Ref Range    Coronavirus 2019, PCR Not Detected Not Detected   ECG 12 lead   Result Value Ref Range    Ventricular Rate 154 BPM    QRS Duration 118 ms    QT Interval 284 ms    QTC Calculation(Bazett) 454 ms    R Axis 54 degrees    T Axis -73 degrees    QRS Count 25 beats    Q Onset 227 ms    T Offset 369 ms    QTC Fredericia 389 ms   Blood Gas Lactic Acid, Venous   Result Value Ref Range    POCT Lactate, Venous 2.6 (H) 0.4 - 2.0 mmol/L            Assessment/Plan   Principal Problem:    Pneumonia of lower lobe due to infectious organism, unspecified laterality  Hypoxia  MEHRAN on CKD  A-fib RVR      Plan:  Continue IV azithromycin/IV Rocephin  Pulmonology consult  Incentive spirometer  As needed DuoNeb  Check procalcitonin  Check urine antigens  Hold for toxic medications  Continue patient metoprolol and digoxin for rate control      HTN/CKD/ systolic chf- furosemide/ losartan held for now resume when able, monitor for volume overload       copd-as needed duoneb    HLD  -continue home regimen and as above      DVT prophylaxis-patient on Eliqueddie Pena, APRN-CNP

## 2024-05-31 ENCOUNTER — TELEPHONE (OUTPATIENT)
Dept: PRIMARY CARE | Facility: CLINIC | Age: 89
End: 2024-05-31
Payer: MEDICARE

## 2024-05-31 LAB
ANION GAP SERPL CALC-SCNC: 9 MMOL/L (ref 10–20)
ATRIAL RATE: 187 BPM
BNP SERPL-MCNC: 414 PG/ML (ref 0–99)
BUN SERPL-MCNC: 34 MG/DL (ref 6–23)
CALCIUM SERPL-MCNC: 7.7 MG/DL (ref 8.6–10.3)
CHLORIDE SERPL-SCNC: 100 MMOL/L (ref 98–107)
CO2 SERPL-SCNC: 27 MMOL/L (ref 21–32)
CREAT SERPL-MCNC: 1.53 MG/DL (ref 0.5–1.05)
DIGOXIN SERPL-MCNC: 1.03 NG/ML (ref 0.8–?)
EGFRCR SERPLBLD CKD-EPI 2021: 31 ML/MIN/1.73M*2
ERYTHROCYTE [DISTWIDTH] IN BLOOD BY AUTOMATED COUNT: 13.6 % (ref 11.5–14.5)
GLUCOSE SERPL-MCNC: 93 MG/DL (ref 74–99)
HCT VFR BLD AUTO: 32.3 % (ref 36–46)
HGB BLD-MCNC: 10.7 G/DL (ref 12–16)
MCH RBC QN AUTO: 31.5 PG (ref 26–34)
MCHC RBC AUTO-ENTMCNC: 33.1 G/DL (ref 32–36)
MCV RBC AUTO: 95 FL (ref 80–100)
NRBC BLD-RTO: 0 /100 WBCS (ref 0–0)
PLATELET # BLD AUTO: 98 X10*3/UL (ref 150–450)
POTASSIUM SERPL-SCNC: 3.9 MMOL/L (ref 3.5–5.3)
PROCALCITONIN SERPL-MCNC: 1.68 NG/ML
Q ONSET: 224 MS
Q ONSET: 227 MS
Q ONSET: 227 MS
QRS COUNT: 18 BEATS
QRS COUNT: 19 BEATS
QRS COUNT: 25 BEATS
QRS DURATION: 116 MS
QRS DURATION: 118 MS
QRS DURATION: 126 MS
QT INTERVAL: 284 MS
QT INTERVAL: 344 MS
QT INTERVAL: 346 MS
QTC CALCULATION(BAZETT): 454 MS
QTC CALCULATION(BAZETT): 467 MS
QTC CALCULATION(BAZETT): 491 MS
QTC FREDERICIA: 389 MS
QTC FREDERICIA: 422 MS
QTC FREDERICIA: 437 MS
R AXIS: 21 DEGREES
R AXIS: 54 DEGREES
R AXIS: 58 DEGREES
RBC # BLD AUTO: 3.4 X10*6/UL (ref 4–5.2)
SODIUM SERPL-SCNC: 132 MMOL/L (ref 136–145)
T AXIS: -65 DEGREES
T AXIS: -68 DEGREES
T AXIS: -73 DEGREES
T OFFSET: 369 MS
T OFFSET: 396 MS
T OFFSET: 400 MS
VENTRICULAR RATE: 111 BPM
VENTRICULAR RATE: 121 BPM
VENTRICULAR RATE: 154 BPM
WBC # BLD AUTO: 12.2 X10*3/UL (ref 4.4–11.3)

## 2024-05-31 PROCEDURE — 2500000004 HC RX 250 GENERAL PHARMACY W/ HCPCS (ALT 636 FOR OP/ED): Performed by: NURSE PRACTITIONER

## 2024-05-31 PROCEDURE — 36415 COLL VENOUS BLD VENIPUNCTURE: CPT | Performed by: INTERNAL MEDICINE

## 2024-05-31 PROCEDURE — 1200000002 HC GENERAL ROOM WITH TELEMETRY DAILY

## 2024-05-31 PROCEDURE — 80162 ASSAY OF DIGOXIN TOTAL: CPT | Performed by: NURSE PRACTITIONER

## 2024-05-31 PROCEDURE — 2500000002 HC RX 250 W HCPCS SELF ADMINISTERED DRUGS (ALT 637 FOR MEDICARE OP, ALT 636 FOR OP/ED): Performed by: NURSE PRACTITIONER

## 2024-05-31 PROCEDURE — 94664 DEMO&/EVAL PT USE INHALER: CPT

## 2024-05-31 PROCEDURE — 84145 PROCALCITONIN (PCT): CPT | Mod: AHULAB | Performed by: NURSE PRACTITIONER

## 2024-05-31 PROCEDURE — 99222 1ST HOSP IP/OBS MODERATE 55: CPT

## 2024-05-31 PROCEDURE — 36415 COLL VENOUS BLD VENIPUNCTURE: CPT | Performed by: NURSE PRACTITIONER

## 2024-05-31 PROCEDURE — 85027 COMPLETE CBC AUTOMATED: CPT | Performed by: INTERNAL MEDICINE

## 2024-05-31 PROCEDURE — 83880 ASSAY OF NATRIURETIC PEPTIDE: CPT | Performed by: NURSE PRACTITIONER

## 2024-05-31 PROCEDURE — 99233 SBSQ HOSP IP/OBS HIGH 50: CPT | Performed by: INTERNAL MEDICINE

## 2024-05-31 PROCEDURE — 2500000004 HC RX 250 GENERAL PHARMACY W/ HCPCS (ALT 636 FOR OP/ED): Performed by: INTERNAL MEDICINE

## 2024-05-31 PROCEDURE — 2500000001 HC RX 250 WO HCPCS SELF ADMINISTERED DRUGS (ALT 637 FOR MEDICARE OP): Performed by: NURSE PRACTITIONER

## 2024-05-31 PROCEDURE — 2500000002 HC RX 250 W HCPCS SELF ADMINISTERED DRUGS (ALT 637 FOR MEDICARE OP, ALT 636 FOR OP/ED): Mod: MUE | Performed by: INTERNAL MEDICINE

## 2024-05-31 PROCEDURE — 80048 BASIC METABOLIC PNL TOTAL CA: CPT | Performed by: INTERNAL MEDICINE

## 2024-05-31 PROCEDURE — 2500000005 HC RX 250 GENERAL PHARMACY W/O HCPCS: Performed by: INTERNAL MEDICINE

## 2024-05-31 PROCEDURE — 94640 AIRWAY INHALATION TREATMENT: CPT

## 2024-05-31 RX ORDER — SODIUM CHLORIDE 9 MG/ML
100 INJECTION, SOLUTION INTRAVENOUS CONTINUOUS
Status: DISCONTINUED | OUTPATIENT
Start: 2024-05-31 | End: 2024-06-03

## 2024-05-31 RX ADMIN — PRAMIPEXOLE DIHYDROCHLORIDE 0.12 MG: 0.25 TABLET ORAL at 20:53

## 2024-05-31 RX ADMIN — SODIUM CHLORIDE 100 ML/HR: 9 INJECTION, SOLUTION INTRAVENOUS at 20:52

## 2024-05-31 RX ADMIN — SIMVASTATIN 20 MG: 20 TABLET, FILM COATED ORAL at 20:52

## 2024-05-31 RX ADMIN — AZITHROMYCIN MONOHYDRATE 500 MG: 500 INJECTION, POWDER, LYOPHILIZED, FOR SOLUTION INTRAVENOUS at 15:51

## 2024-05-31 RX ADMIN — APIXABAN 2.5 MG: 2.5 TABLET, FILM COATED ORAL at 09:35

## 2024-05-31 RX ADMIN — METOPROLOL SUCCINATE 100 MG: 50 TABLET, EXTENDED RELEASE ORAL at 20:52

## 2024-05-31 RX ADMIN — APIXABAN 2.5 MG: 2.5 TABLET, FILM COATED ORAL at 20:53

## 2024-05-31 RX ADMIN — IPRATROPIUM BROMIDE AND ALBUTEROL SULFATE 3 ML: 2.5; .5 SOLUTION RESPIRATORY (INHALATION) at 07:57

## 2024-05-31 RX ADMIN — METOPROLOL SUCCINATE 100 MG: 50 TABLET, EXTENDED RELEASE ORAL at 09:35

## 2024-05-31 RX ADMIN — MIRTAZAPINE 7.5 MG: 15 TABLET, FILM COATED ORAL at 20:52

## 2024-05-31 RX ADMIN — Medication 4 L/MIN: at 20:39

## 2024-05-31 RX ADMIN — CEFTRIAXONE 2 G: 2 INJECTION, POWDER, FOR SOLUTION INTRAMUSCULAR; INTRAVENOUS at 14:59

## 2024-05-31 ASSESSMENT — COGNITIVE AND FUNCTIONAL STATUS - GENERAL
MOBILITY SCORE: 11
DRESSING REGULAR LOWER BODY CLOTHING: A LOT
CLIMB 3 TO 5 STEPS WITH RAILING: TOTAL
HELP NEEDED FOR BATHING: A LOT
DRESSING REGULAR UPPER BODY CLOTHING: A LOT
STANDING UP FROM CHAIR USING ARMS: A LOT
MOVING FROM LYING ON BACK TO SITTING ON SIDE OF FLAT BED WITH BEDRAILS: A LOT
TURNING FROM BACK TO SIDE WHILE IN FLAT BAD: A LOT
MOVING TO AND FROM BED TO CHAIR: A LOT
DAILY ACTIVITIY SCORE: 13
WALKING IN HOSPITAL ROOM: A LOT
EATING MEALS: A LITTLE
PERSONAL GROOMING: A LOT
TOILETING: A LOT

## 2024-05-31 ASSESSMENT — ACTIVITIES OF DAILY LIVING (ADL): LACK_OF_TRANSPORTATION: NO

## 2024-05-31 NOTE — PROGRESS NOTES
05/31/24 0934   Discharge Planning   Living Arrangements Children   Support Systems Children   Assistance Needed relies on others for assistance   Type of Residence Private residence   Who is requesting discharge planning? Provider   Home or Post Acute Services In home services   Type of Home Care Services Home nursing visits;Home OT;Home PT   Patient expects to be discharged to: Home with son, most likely with [facility], need to speak to son once he is avail   Does the patient need discharge transport arranged? Yes   RoundTrip coordination needed? Yes   Housing Stability   In the last 12 months, was there a time when you were not able to pay the mortgage or rent on time? N   In the last 12 months, how many places have you lived? 1   In the last 12 months, was there a time when you did not have a steady place to sleep or slept in a shelter (including now)? N   Transportation Needs   In the past 12 months, has lack of transportation kept you from medical appointments or from getting medications? no   In the past 12 months, has lack of transportation kept you from meetings, work, or from getting things needed for daily living? No     Met with patient at bedside  Explained role of TCC  Patient admitted for cough/diarrhea    Patient states her pcp is Dr Panda, she does not remember where her son gets her meds from, but she does live with her son Osmar [phone] and would like me to talk to him regarding dc planning, breakfast try in front of patient who states she is finished, when this TCC said it did not look like she ate much patient states she does not have much of an appetite.     ADOD 1-3 days  BARRIERS- cdiff? Tolerating more po intake, pna, afib rvr, marilin on ckd  DISPO home with son with [facility]    1223- spoke to son osmar at bedside, he states he cares for both patient and patients daughter (who is his wife and she has dementia) normally  patient is very independent with all her care, Osmar does provide all her  transportation, he is asking for pt/ot to make sure patient is ok to move about at home, may be interested in HHC. Dr Nj aware that patients son would like call for medical update, contact info for son secure chatted to MD.

## 2024-05-31 NOTE — PROGRESS NOTES
"Constance Apodaca is a 89 y.o. female on day 1 of admission presenting with Pneumonia of lower lobe due to infectious organism, unspecified laterality.    Subjective   No acute events overnight. Appears comfortable. Still with RVR but better controlled.        Objective     VITALS  Blood pressure (!) 107/48, pulse 99, temperature 36.7 °C (98.1 °F), temperature source Temporal, resp. rate 16, height 1.6 m (5' 3\"), weight (!) 42.9 kg (94 lb 9.6 oz), SpO2 93%.  Physical Exam  Vitals and nursing note reviewed.   Constitutional:       General: She is not in acute distress.     Appearance: Normal appearance. She is underweight.   HENT:      Head: Normocephalic and atraumatic.   Cardiovascular:      Rate and Rhythm: Normal rate and regular rhythm.      Heart sounds: Normal heart sounds.   Pulmonary:      Effort: Pulmonary effort is normal. No respiratory distress.      Breath sounds: Normal breath sounds. No wheezing.   Abdominal:      General: Abdomen is flat. Bowel sounds are normal. There is no distension.      Palpations: Abdomen is soft.      Tenderness: There is no abdominal tenderness.   Musculoskeletal:         General: No swelling or tenderness. Normal range of motion.   Skin:     General: Skin is warm and dry.      Capillary Refill: Capillary refill takes less than 2 seconds.   Neurological:      General: No focal deficit present.      Mental Status: She is alert and oriented to person, place, and time.   Psychiatric:         Mood and Affect: Mood normal.           Intake/Output last 3 Shifts:  I/O last 3 completed shifts:  In: - (0 mL/kg)   Out: 1 (0 mL/kg) [Urine:1 (0 mL/kg/hr)]  Weight: 42.9 kg     Relevant Results  Results for orders placed or performed during the hospital encounter of 05/30/24 (from the past 24 hour(s))   ECG 12 lead   Result Value Ref Range    Ventricular Rate 121 BPM    QRS Duration 116 ms    QT Interval 346 ms    QTC Calculation(Bazett) 491 ms    R Axis 58 degrees    T Axis -65 degrees    QRS " Count 19 beats    Q Onset 227 ms    T Offset 400 ms    QTC Fredericia 437 ms   CBC and Auto Differential   Result Value Ref Range    WBC 13.9 (H) 4.4 - 11.3 x10*3/uL    nRBC 0.0 0.0 - 0.0 /100 WBCs    RBC 4.27 4.00 - 5.20 x10*6/uL    Hemoglobin 13.4 12.0 - 16.0 g/dL    Hematocrit 41.1 36.0 - 46.0 %    MCV 96 80 - 100 fL    MCH 31.4 26.0 - 34.0 pg    MCHC 32.6 32.0 - 36.0 g/dL    RDW 13.7 11.5 - 14.5 %    Platelets 112 (L) 150 - 450 x10*3/uL    Neutrophils % 88.5 40.0 - 80.0 %    Immature Granulocytes %, Automated 0.6 0.0 - 0.9 %    Lymphocytes % 5.8 13.0 - 44.0 %    Monocytes % 4.9 2.0 - 10.0 %    Eosinophils % 0.0 0.0 - 6.0 %    Basophils % 0.2 0.0 - 2.0 %    Neutrophils Absolute 12.26 (H) 1.60 - 5.50 x10*3/uL    Immature Granulocytes Absolute, Automated 0.08 0.00 - 0.50 x10*3/uL    Lymphocytes Absolute 0.80 0.80 - 3.00 x10*3/uL    Monocytes Absolute 0.68 0.05 - 0.80 x10*3/uL    Eosinophils Absolute 0.00 0.00 - 0.40 x10*3/uL    Basophils Absolute 0.03 0.00 - 0.10 x10*3/uL   Basic metabolic panel   Result Value Ref Range    Glucose 114 (H) 74 - 99 mg/dL    Sodium 131 (L) 136 - 145 mmol/L    Potassium 4.3 3.5 - 5.3 mmol/L    Chloride 95 (L) 98 - 107 mmol/L    Bicarbonate 22 21 - 32 mmol/L    Anion Gap 18 10 - 20 mmol/L    Urea Nitrogen 38 (H) 6 - 23 mg/dL    Creatinine 1.82 (H) 0.50 - 1.05 mg/dL    eGFR 25 (L) >60 mL/min/1.73m*2    Calcium 8.6 8.6 - 10.3 mg/dL   Hepatic function panel   Result Value Ref Range    Albumin 4.1 3.4 - 5.0 g/dL    Bilirubin, Total 1.0 0.0 - 1.2 mg/dL    Bilirubin, Direct 0.2 0.0 - 0.3 mg/dL    Alkaline Phosphatase 37 33 - 136 U/L    ALT 14 7 - 45 U/L    AST 56 (H) 9 - 39 U/L    Total Protein 8.3 (H) 6.4 - 8.2 g/dL   B-Type Natriuretic Peptide   Result Value Ref Range     (H) 0 - 99 pg/mL   Blood Gas Venous Full Panel   Result Value Ref Range    POCT pH, Venous 7.46 (H) 7.33 - 7.43 pH    POCT pCO2, Venous 37 (L) 41 - 51 mm Hg    POCT pO2, Venous 15 (L) 35 - 45 mm Hg    POCT SO2,  Venous 14 (L) 45 - 75 %    POCT Oxy Hemoglobin, Venous 14.1 (L) 45.0 - 75.0 %    POCT Hematocrit Calculated, Venous 43.0 36.0 - 46.0 %    POCT Sodium, Venous 132 (L) 136 - 145 mmol/L    POCT Potassium, Venous 4.5 3.5 - 5.3 mmol/L    POCT Chloride, Venous 97 (L) 98 - 107 mmol/L    POCT Ionized Calicum, Venous 1.02 (L) 1.10 - 1.33 mmol/L    POCT Glucose, Venous 126 (H) 74 - 99 mg/dL    POCT Lactate, Venous 2.8 (H) 0.4 - 2.0 mmol/L    POCT Base Excess, Venous 2.5 -2.0 - 3.0 mmol/L    POCT HCO3 Calculated, Venous 26.3 (H) 22.0 - 26.0 mmol/L    POCT Hemoglobin, Venous 14.2 12.0 - 16.0 g/dL    POCT Anion Gap, Venous 13.0 10.0 - 25.0 mmol/L    Patient Temperature 37.0 degrees Celsius    FiO2 21 %   Sars-CoV-2 PCR   Result Value Ref Range    Coronavirus 2019, PCR Not Detected Not Detected   Blood Culture    Specimen: Peripheral Venipuncture; Blood culture   Result Value Ref Range    Blood Culture Loaded on Instrument - Culture in progress    Blood Culture    Specimen: Peripheral Venipuncture; Blood culture   Result Value Ref Range    Blood Culture Loaded on Instrument - Culture in progress    Digoxin   Result Value Ref Range    Digoxin  1.79 0.80 - <2.00 ng/mL   ECG 12 lead   Result Value Ref Range    Ventricular Rate 154 BPM    QRS Duration 118 ms    QT Interval 284 ms    QTC Calculation(Bazett) 454 ms    R Axis 54 degrees    T Axis -73 degrees    QRS Count 25 beats    Q Onset 227 ms    T Offset 369 ms    QTC Fredericia 389 ms   Blood Gas Lactic Acid, Venous   Result Value Ref Range    POCT Lactate, Venous 2.6 (H) 0.4 - 2.0 mmol/L   Electrocardiogram, 12-lead PRN ACS symptoms   Result Value Ref Range    Ventricular Rate 111 BPM    Atrial Rate 187 BPM    QRS Duration 126 ms    QT Interval 344 ms    QTC Calculation(Bazett) 467 ms    R Axis 21 degrees    T Axis -68 degrees    QRS Count 18 beats    Q Onset 224 ms    T Offset 396 ms    QTC Fredericia 422 ms   B-Type Natriuretic Peptide   Result Value Ref Range     (H) 0  - 99 pg/mL   Digoxin   Result Value Ref Range    Digoxin  1.03 0.80 - <2.00 ng/mL   Basic metabolic panel   Result Value Ref Range    Glucose 93 74 - 99 mg/dL    Sodium 132 (L) 136 - 145 mmol/L    Potassium 3.9 3.5 - 5.3 mmol/L    Chloride 100 98 - 107 mmol/L    Bicarbonate 27 21 - 32 mmol/L    Anion Gap 9 (L) 10 - 20 mmol/L    Urea Nitrogen 34 (H) 6 - 23 mg/dL    Creatinine 1.53 (H) 0.50 - 1.05 mg/dL    eGFR 31 (L) >60 mL/min/1.73m*2    Calcium 7.7 (L) 8.6 - 10.3 mg/dL   CBC   Result Value Ref Range    WBC 12.2 (H) 4.4 - 11.3 x10*3/uL    nRBC 0.0 0.0 - 0.0 /100 WBCs    RBC 3.40 (L) 4.00 - 5.20 x10*6/uL    Hemoglobin 10.7 (L) 12.0 - 16.0 g/dL    Hematocrit 32.3 (L) 36.0 - 46.0 %    MCV 95 80 - 100 fL    MCH 31.5 26.0 - 34.0 pg    MCHC 33.1 32.0 - 36.0 g/dL    RDW 13.6 11.5 - 14.5 %    Platelets 98 (L) 150 - 450 x10*3/uL       Imaging Results  XR chest 1 view   Final Result   Bilateral peribronchial thickening/bronchitis.   Signed by Manjeet Goddard MD          Medications:  apixaban, 2.5 mg, oral, BID  azithromycin, 500 mg, intravenous, q24h  cefTRIAXone, 2 g, intravenous, q24h  digoxin, 125 mcg, oral, Once per day on Sunday Tuesday Wednesday Thursday Saturday  [Held by provider] furosemide, 20 mg, oral, Daily  ipratropium-albuteroL, 3 mL, nebulization, TID  [Held by provider] losartan, 100 mg, oral, Daily  metoprolol succinate XL, 100 mg, oral, BID  mirtazapine, 7.5 mg, oral, Nightly  pramipexole, 0.125 mg, oral, Nightly  simvastatin, 20 mg, oral, Nightly       PRN medications: acetaminophen, ipratropium-albuteroL, ondansetron, oxybutynin, oxygen        Assessment/Plan   Principal Problem:    Pneumonia of lower lobe due to infectious organism, unspecified laterality    Pneumonia of the lower lobe  -Continue IV azithromycin/IV Rocephin  -Incentive spirometer  -As needed DuoNeb  -Check procalcitonin  -Check urine antigens    Afib with RVR   -Continue patient metoprolol and digoxin for rate control  -Eliquis   -Cardio  consulted, appreciate their recs         HTN  -Currently hypotensive   -Hold home meds     CKD  -Creatinine stable     Systolic chf  - furosemide/ losartan held for now    Copd  -as needed duoneb     HLD  -continue home regimen and as above     DVT Prophylaxis:  Eliquis    Disposition:  Hope can DC over the weekend.     Efren Nj, DO [facility]

## 2024-05-31 NOTE — TELEPHONE ENCOUNTER
Spoke to her son in law Osmar and she is admitted to [facility] for pneumonia.  Caught cough from his wife after their travels but also had GI symptoms

## 2024-05-31 NOTE — CONSULTS
Inpatient consult to Cardiology  Consult performed by: Benedicto Pineda MD  Consult ordered by: Mk Pena, APRN-CNP  Reason for consult: DESIREE.rick        History Of Present Illness:    Constance Apodaca is a 89 y.o. female with a past medical history of permanent atrial fibrillation on apixaban, hypertension, hyperlipidemia, osteoporosis, GERD, urinary retention and DVT presenting with cough and diarrhea.  Per patient's son who is primarily is doing states that patient has had a cough since Saturday.  He states that they recently traveled to visit family, and those family members also had a cough.  Patient states that his wife also developed a cough as well.  Patient reports that his mom has been generally weak over the last few days has had a poor appetite, and she also has had diarrhea for 2 to 3 days.  He denies any recent treatment with antibiotics,, and states that his mom has been doing well for the last couple years until now.  Patient cough is moist and productive.  She is required 3 L nasal cannula.  White blood cell count was noted to be 13.9, sodium 131, potassium 4.3, BUN 38, creatinine 1.82, .  Chest x-ray showed bilateral peribronchial thickening/bronchitis.  No fever or chills noted.  Patient was tachycardic In the 140s EKG showed atrial fibrillation which patient has a history of.  Patient was given 5 mg of IV metoprolol with improvement in her heart rate.  Patient denies chest pain, shortness of breath, abdominal pain, nausea, vomiting.  Patient was given Rocephin and azithromycin IV along with 500 mL bolus of lactated Ringer's. Cardiology consulted for A.rick mgmt..     TTE 2022:    CONCLUSIONS:   1. The left ventricular systolic function is normal with a 55-60% estimated ejection fraction.   2. Spectral Doppler shows an abnormal pattern of left ventricular diastolic filling.   3. The patient is in atrial fibrillation which may influence the estimate of left ventricular function and transvalvular  flows.   4. The left atrium is severely dilated.   5. The right atrium is moderately dilated.   6. Mild to moderately elevated right ventricular systolic pressure.   7. There is mild to moderate tricuspid regurgitation.   8. There is mild aortic valve regurgitation.   9. The left ventricular cavity size is decreased.        Last Recorded Vitals:  Vitals:    05/30/24 2227 05/31/24 0254 05/31/24 0758 05/31/24 0810   BP: 106/60 97/58  (!) 107/48   BP Location: Right arm Left arm  Left arm   Patient Position: Lying Lying  Lying   Pulse:  107  99   Resp:    16   Temp:  36.3 °C (97.3 °F)  36.7 °C (98.1 °F)   TempSrc:  Temporal  Temporal   SpO2: 92% 93% 94% 93%   Weight:       Height:           Last Labs:  CBC - 5/31/2024:  7:49 AM  12.2 10.7 98    32.3      CMP - 5/31/2024:  7:49 AM  7.7 8.3 56 --- 1.0   _ 4.1 14 37      PTT - No results in last year.  _   _ _     BNP   Date/Time Value Ref Range Status   05/31/2024 06:52  (H) 0 - 99 pg/mL Final   05/30/2024 01:11  (H) 0 - 99 pg/mL Final     Hemoglobin A1C   Date/Time Value Ref Range Status   04/01/2019 05:16 AM 5.8 % Final     Comment:          Diagnosis of Diabetes-Adults   Non-Diabetic: < or = 5.6%   Increased risk for developing diabetes: 5.7-6.4%   Diagnostic of diabetes: > or = 6.5%  .       Monitoring of Diabetes                Age (y)     Therapeutic Goal (%)   Adults:          >18           <7.0   Pediatrics:    13-18           <7.5                   7-12           <8.0                   0- 6            7.5-8.5   American Diabetes Association. Diabetes Care 33(S1), Jan 2010.       VLDL   Date/Time Value Ref Range Status   04/20/2018 04:58 AM 21 0 - 40 mg/dL Final      Last I/O:  I/O last 3 completed shifts:  In: - (0 mL/kg)   Out: 1 (0 mL/kg) [Urine:1 (0 mL/kg/hr)]  Weight: 42.9 kg     Past Cardiology Tests (Last 3 Years):  EKG:  Electrocardiogram, 12-lead PRN ACS symptoms 05/30/2024 (Preliminary)      ECG 12 lead 05/30/2024      ECG 12 lead 05/30/2024  "(Preliminary)    Echo:  No results found for this or any previous visit from the past 1095 days.    Ejection Fractions:  No results found for: \"EF\"  Cath:  No results found for this or any previous visit from the past 1095 days.    Stress Test:  No results found for this or any previous visit from the past 1095 days.    Cardiac Imaging:  No results found for this or any previous visit from the past 1095 days.      Past Medical History:  She has a past medical history of Atherosclerotic heart disease of native coronary artery without angina pectoris (10/12/2017), Chronic systolic (congestive) heart failure (Multi) (04/18/2022), Disorder of lacrimal system, unspecified (11/08/2018), Encounter for immunization (10/08/2020), Fracture of unspecified part of neck of unspecified femur, initial encounter for closed fracture (Multi) (02/18/2021), Hypokalemia (08/01/2018), Other cardiomyopathies (Multi) (02/14/2022), Other conditions influencing health status, Other injury of unspecified body region, initial encounter (02/14/2020), Other specified abnormal findings of blood chemistry (08/12/2014), Other specified disorders of eye and adnexa (11/08/2018), Other specified health status (09/28/2017), Pain in right hip (02/14/2020), Pain in unspecified foot (02/14/2014), Pain in unspecified hip (03/22/2018), Pain in unspecified knee, Personal history of diseases of the skin and subcutaneous tissue (02/14/2014), Personal history of other diseases of the circulatory system (07/09/2018), Personal history of other diseases of the circulatory system (04/19/2018), Personal history of other diseases of the circulatory system (03/22/2018), Personal history of other diseases of the circulatory system, Personal history of other diseases of the circulatory system, Personal history of other diseases of the digestive system (05/23/2019), Personal history of other diseases of the musculoskeletal system and connective tissue (06/29/2015), " Personal history of other diseases of the nervous system and sense organs (11/08/2018), Personal history of other diseases of the nervous system and sense organs (06/16/2016), Personal history of other diseases of the nervous system and sense organs (11/17/2016), Personal history of other diseases of the nervous system and sense organs (12/29/2017), Personal history of other endocrine, nutritional and metabolic disease (04/14/2016), Personal history of other endocrine, nutritional and metabolic disease, Personal history of other infectious and parasitic diseases, Personal history of other mental and behavioral disorders (08/16/2018), Personal history of other specified conditions (09/13/2018), Personal history of other specified conditions (07/10/2015), Personal history of other specified conditions (03/13/2019), Personal history of other specified conditions (03/15/2020), Personal history of other specified conditions (04/25/2022), Personal history of pneumonia (recurrent), Personal history of urinary (tract) infections (11/12/2021), Personal history of urinary (tract) infections (07/02/2020), Pneumonitis due to inhalation of food and vomit (Multi), Presence of unspecified artificial hip joint (10/22/2019), Presence of unspecified artificial hip joint (03/22/2018), Rash and other nonspecific skin eruption (11/05/2013), Retention of urine, unspecified (07/17/2015), Unilateral primary osteoarthritis, right knee, and Unspecified fall, initial encounter (02/15/2018).    Past Surgical History:  She has a past surgical history that includes Other surgical history (06/07/2013) and Total abdominal hysterectomy w/ bilateral salpingoophorectomy (07/10/2015).      Social History:  She has no history on file for tobacco use, alcohol use, and drug use.    Family History:  No family history on file.     Allergies:  Patient has no known allergies.    Inpatient Medications:  Scheduled medications   Medication Dose Route Frequency     apixaban  2.5 mg oral BID    azithromycin  500 mg intravenous q24h    cefTRIAXone  2 g intravenous q24h    digoxin  125 mcg oral Once per day on Sunday Tuesday Wednesday Thursday Saturday    [Held by provider] furosemide  20 mg oral Daily    ipratropium-albuteroL  3 mL nebulization TID    [Held by provider] losartan  100 mg oral Daily    metoprolol succinate XL  100 mg oral BID    mirtazapine  7.5 mg oral Nightly    pramipexole  0.125 mg oral Nightly    simvastatin  20 mg oral Nightly     PRN medications   Medication    acetaminophen    ipratropium-albuteroL    ondansetron    oxybutynin    oxygen     Continuous Medications   Medication Dose Last Rate     Outpatient Medications:  Current Outpatient Medications   Medication Instructions    calcium carbonate-vit D3-min 600 mg calcium- 400 unit tablet 1 tablet, oral, Daily    digoxin (LANOXIN) 125 mcg, oral, 5 times weekly,  TAKE 1 TABLET BY MOUTH EVERY MONDAY, TUESDAY, THURSDAY, FRIDAY, AND SATURDAY<BR>    Eliquis 2.5 mg, oral, 2 times daily    furosemide (LASIX) 20 mg, oral, Daily, TAKE 1 TABLET BY MOUTH DAILY    losartan (COZAAR) 100 mg, oral, Daily,  TAKE 1 TABLET BY MOUTH DAILY    metoprolol succinate XL (TOPROL-XL) 100 mg, oral, 2 times daily, TAKE 1 TABLET BY MOUTH TWICE DAILY    mirtazapine (REMERON) 7.5 mg, oral, Nightly    oxybutynin XL (Ditropan-XL) 5 mg 24 hr tablet TAKE 1 TABLET IN THE MORNING AND 1 IN THE EVENING as needed for urinary frequency.  Causes dry mouth    potassium chloride CR 20 mEq ER tablet 20 mEq, oral, Daily, Do not crush or chew.    pramipexole (MIRAPEX) 0.125 mg, oral, Nightly, Restless legs    simvastatin (ZOCOR) 20 mg, oral, Nightly       Physical Exam:  Constitutional:       General: She is not in acute distress.     Appearance: Normal appearance. She is underweight.   HENT:      Head: Normocephalic and atraumatic. Dry mucous membranes  Cardiovascular:      Rate and Rhythm: Irregularly irregular, no m/r/g, no ESTHER, no JVD     Heart  sounds: Normal heart sounds.   Pulmonary:      Effort: Pulmonary effort is normal. No respiratory distress.      Breath sounds: Normal breath sounds. No wheezing.   Abdominal:      General: Abdomen is flat. Bowel sounds are normal. There is no distension.      Palpations: Abdomen is soft.      Tenderness: There is no abdominal tenderness.      Assessment/Plan   Patient currently is asymptomatic but does endorse cough and SOB. She is largely rate controlled at this time with HR between 95 and 115 bpm. There are no signs of decompensated HF on exam. In fact, she looks slightly hypovolemic on exam.     Recs:  -continue Metop  -continue Digoxin  -AC per primary team while inpatient  -give 500 ml of IVF  and if no increase in hypoxia then give another 500 ml    We will continue to follow.     Code Status:  Full Code    Benedicto Pineda MD    ==========================  Attending note  ==========================    Both the fellow and I have had a face to face encounter with the patient today. I have examined the patient and edited the documented physical examination as necessary.  I personally reviewed the patient's recent labs, medications, orders, EKGs, and pertinent cardiac imaging.  I have reviewed the fellow's encounter note, approve the fellow's documentation and have edited the note to reflect the diagnostic and therapeutic plan.    Constance Apodaca is a 89-year-old female with a history of permanent atrial fibrillation on apixaban, hypertension, hyperlipidemia, osteoporosis, GERD, urinary retention, and DVT, presenting with a moist, productive cough and diarrhea. Her son reports that the cough began after recent travel to visit family members who were also ill, and his wife has developed a similar cough. The patient has been weak, with poor appetite, and has had diarrhea for 2-3 days. Labs revealed WBC 13.9, sodium 131, potassium 4.3, BUN 38, creatinine 1.82, . Chest X-ray showed bilateral peribronchial  thickening/bronchitis. She was tachycardic in the 140s, with EKG showing atrial fibrillation, treated with 5 mg IV metoprolol resulting in improved heart rate. She denies chest pain, shortness of breath, abdominal pain, nausea, or vomiting. She was given Rocephin and azithromycin IV along with a 500 mL bolus of lactated Ringer's. Cardiology consulted for atrial fibrillation management.    Past medical history:  As above.    Medications were reviewed.    Allergies were reviewed.    Vital signs, telemetry, medications, labs, and imaging were reviewed as well.    General:  Patient is awake, alert, and oriented.  Patient is in no acute distress.  HEENT:  Pupils equal and reactive.  Normocephalic.  Moist mucosa.    Neck:  No thyromegaly.  Normal Jugular Venous Pressure.  Cardiovascular:  Regular rate and rhythm.  Normal S1 and S2.  Pulmonary:  Clear to auscultation bilaterally.  Abdomen:  Soft. Non-tender.   Non-distended.  Positive bowel sounds.  Lower Extremities:  2+ pedal pulses. No LE edema.  Neurologic:  Cranial nerves intact.  No focal deficit.   Skin: Skin warm and dry, normal skin turgor.   Psychiatric: Normal affect.    Assessment/Plan   The patient is currently asymptomatic but does report a cough and shortness of breath.   Her heart rate is largely rate controlled, ranging between 95 and 115 bpm.   There are no signs of decompensated heart failure on examination; she appears slightly hypovolemic.   Recommendations include continuing metoprolol and digoxin, anticoagulation as per the primary team while inpatient, and administering 500 ml of IV fluids with a subsequent 500 ml if there is no increase in hypoxia.    Recs:  -continue Metop  -continue Digoxin  -AC per primary team while inpatient  -give 500 ml of IVF  and if no increase in hypoxia then give another 500 ml    We will continue to follow.     Kaveh Laguna MD

## 2024-06-01 LAB
ANION GAP SERPL CALC-SCNC: 12 MMOL/L (ref 10–20)
BUN SERPL-MCNC: 30 MG/DL (ref 6–23)
CALCIUM SERPL-MCNC: 7 MG/DL (ref 8.6–10.3)
CHLORIDE SERPL-SCNC: 102 MMOL/L (ref 98–107)
CO2 SERPL-SCNC: 24 MMOL/L (ref 21–32)
CREAT SERPL-MCNC: 1.23 MG/DL (ref 0.5–1.05)
DIGOXIN SERPL-MCNC: 0.67 NG/ML (ref 0.8–?)
EGFRCR SERPLBLD CKD-EPI 2021: 40 ML/MIN/1.73M*2
ERYTHROCYTE [DISTWIDTH] IN BLOOD BY AUTOMATED COUNT: 13.3 % (ref 11.5–14.5)
GLUCOSE SERPL-MCNC: 96 MG/DL (ref 74–99)
HCT VFR BLD AUTO: 32.4 % (ref 36–46)
HGB BLD-MCNC: 10.8 G/DL (ref 12–16)
MCH RBC QN AUTO: 31.6 PG (ref 26–34)
MCHC RBC AUTO-ENTMCNC: 33.3 G/DL (ref 32–36)
MCV RBC AUTO: 95 FL (ref 80–100)
NRBC BLD-RTO: 0 /100 WBCS (ref 0–0)
PLATELET # BLD AUTO: 103 X10*3/UL (ref 150–450)
POTASSIUM SERPL-SCNC: 3.5 MMOL/L (ref 3.5–5.3)
RBC # BLD AUTO: 3.42 X10*6/UL (ref 4–5.2)
SODIUM SERPL-SCNC: 134 MMOL/L (ref 136–145)
WBC # BLD AUTO: 11.5 X10*3/UL (ref 4.4–11.3)

## 2024-06-01 PROCEDURE — 99232 SBSQ HOSP IP/OBS MODERATE 35: CPT | Performed by: NURSE PRACTITIONER

## 2024-06-01 PROCEDURE — 99232 SBSQ HOSP IP/OBS MODERATE 35: CPT | Performed by: INTERNAL MEDICINE

## 2024-06-01 PROCEDURE — 36415 COLL VENOUS BLD VENIPUNCTURE: CPT | Performed by: INTERNAL MEDICINE

## 2024-06-01 PROCEDURE — 9420000001 HC RT PATIENT EDUCATION 5 MIN

## 2024-06-01 PROCEDURE — 2500000004 HC RX 250 GENERAL PHARMACY W/ HCPCS (ALT 636 FOR OP/ED): Performed by: NURSE PRACTITIONER

## 2024-06-01 PROCEDURE — 2500000002 HC RX 250 W HCPCS SELF ADMINISTERED DRUGS (ALT 637 FOR MEDICARE OP, ALT 636 FOR OP/ED): Mod: MUE | Performed by: NURSE PRACTITIONER

## 2024-06-01 PROCEDURE — 2500000002 HC RX 250 W HCPCS SELF ADMINISTERED DRUGS (ALT 637 FOR MEDICARE OP, ALT 636 FOR OP/ED): Mod: MUE | Performed by: INTERNAL MEDICINE

## 2024-06-01 PROCEDURE — 94640 AIRWAY INHALATION TREATMENT: CPT

## 2024-06-01 PROCEDURE — 94668 MNPJ CHEST WALL SBSQ: CPT

## 2024-06-01 PROCEDURE — 2500000001 HC RX 250 WO HCPCS SELF ADMINISTERED DRUGS (ALT 637 FOR MEDICARE OP): Performed by: NURSE PRACTITIONER

## 2024-06-01 PROCEDURE — 80162 ASSAY OF DIGOXIN TOTAL: CPT | Performed by: NURSE PRACTITIONER

## 2024-06-01 PROCEDURE — 80048 BASIC METABOLIC PNL TOTAL CA: CPT | Performed by: INTERNAL MEDICINE

## 2024-06-01 PROCEDURE — 85027 COMPLETE CBC AUTOMATED: CPT | Performed by: INTERNAL MEDICINE

## 2024-06-01 PROCEDURE — 94667 MNPJ CHEST WALL 1ST: CPT

## 2024-06-01 PROCEDURE — 2500000005 HC RX 250 GENERAL PHARMACY W/O HCPCS: Performed by: INTERNAL MEDICINE

## 2024-06-01 PROCEDURE — 1200000002 HC GENERAL ROOM WITH TELEMETRY DAILY

## 2024-06-01 RX ADMIN — Medication 3 L/MIN: at 14:37

## 2024-06-01 RX ADMIN — ACETAMINOPHEN 650 MG: 325 TABLET ORAL at 05:56

## 2024-06-01 RX ADMIN — Medication 3 L/MIN: at 20:21

## 2024-06-01 RX ADMIN — DIGOXIN 125 MCG: 125 TABLET ORAL at 08:54

## 2024-06-01 RX ADMIN — METOPROLOL SUCCINATE 100 MG: 50 TABLET, EXTENDED RELEASE ORAL at 21:14

## 2024-06-01 RX ADMIN — APIXABAN 2.5 MG: 2.5 TABLET, FILM COATED ORAL at 21:14

## 2024-06-01 RX ADMIN — APIXABAN 2.5 MG: 2.5 TABLET, FILM COATED ORAL at 08:54

## 2024-06-01 RX ADMIN — MIRTAZAPINE 7.5 MG: 15 TABLET, FILM COATED ORAL at 21:14

## 2024-06-01 RX ADMIN — AZITHROMYCIN MONOHYDRATE 500 MG: 500 INJECTION, POWDER, LYOPHILIZED, FOR SOLUTION INTRAVENOUS at 16:48

## 2024-06-01 RX ADMIN — SIMVASTATIN 20 MG: 20 TABLET, FILM COATED ORAL at 21:14

## 2024-06-01 RX ADMIN — IPRATROPIUM BROMIDE AND ALBUTEROL SULFATE 3 ML: 2.5; .5 SOLUTION RESPIRATORY (INHALATION) at 14:37

## 2024-06-01 RX ADMIN — IPRATROPIUM BROMIDE AND ALBUTEROL SULFATE 3 ML: 2.5; .5 SOLUTION RESPIRATORY (INHALATION) at 20:21

## 2024-06-01 RX ADMIN — IPRATROPIUM BROMIDE AND ALBUTEROL SULFATE 3 ML: 2.5; .5 SOLUTION RESPIRATORY (INHALATION) at 08:26

## 2024-06-01 RX ADMIN — Medication 3 L/MIN: at 08:26

## 2024-06-01 RX ADMIN — METOPROLOL SUCCINATE 100 MG: 50 TABLET, EXTENDED RELEASE ORAL at 08:54

## 2024-06-01 RX ADMIN — CEFTRIAXONE 2 G: 2 INJECTION, POWDER, FOR SOLUTION INTRAMUSCULAR; INTRAVENOUS at 18:25

## 2024-06-01 RX ADMIN — PRAMIPEXOLE DIHYDROCHLORIDE 0.12 MG: 0.25 TABLET ORAL at 21:14

## 2024-06-01 ASSESSMENT — COGNITIVE AND FUNCTIONAL STATUS - GENERAL
MOVING FROM LYING ON BACK TO SITTING ON SIDE OF FLAT BED WITH BEDRAILS: A LOT
EATING MEALS: A LITTLE
TURNING FROM BACK TO SIDE WHILE IN FLAT BAD: A LOT
HELP NEEDED FOR BATHING: A LOT
DRESSING REGULAR UPPER BODY CLOTHING: A LOT
MOVING TO AND FROM BED TO CHAIR: A LOT
TOILETING: A LOT
DAILY ACTIVITIY SCORE: 13
PERSONAL GROOMING: A LOT
STANDING UP FROM CHAIR USING ARMS: A LOT
MOBILITY SCORE: 11
CLIMB 3 TO 5 STEPS WITH RAILING: TOTAL
WALKING IN HOSPITAL ROOM: A LOT
DRESSING REGULAR LOWER BODY CLOTHING: A LOT

## 2024-06-01 ASSESSMENT — PAIN DESCRIPTION - LOCATION: LOCATION: LEG

## 2024-06-01 ASSESSMENT — PAIN - FUNCTIONAL ASSESSMENT: PAIN_FUNCTIONAL_ASSESSMENT: 0-10

## 2024-06-01 ASSESSMENT — PAIN DESCRIPTION - ORIENTATION: ORIENTATION: RIGHT;LEFT

## 2024-06-01 ASSESSMENT — PAIN SCALES - GENERAL: PAINLEVEL_OUTOF10: 3

## 2024-06-01 NOTE — PROGRESS NOTES
"Constance Apodaca is a 89 y.o. female on day 2 of admission presenting with Pneumonia of lower lobe due to infectious organism, unspecified laterality.    Subjective   No acute events overnight. Appears comfortable. Rate much better controlled today. Discussed with son in law at the bedside. He would overall like to take her home over SNF but would like her evaluated first.        Objective     VITALS  Blood pressure 113/56, pulse 102, temperature 36 °C (96.8 °F), temperature source Temporal, resp. rate 16, height 1.6 m (5' 3\"), weight (!) 42.9 kg (94 lb 9.6 oz), SpO2 94%.  Physical Exam  Vitals and nursing note reviewed.   Constitutional:       General: She is not in acute distress.     Appearance: Normal appearance. She is underweight.   HENT:      Head: Normocephalic and atraumatic.   Cardiovascular:      Rate and Rhythm: Normal rate and regular rhythm.      Heart sounds: Normal heart sounds.   Pulmonary:      Effort: Pulmonary effort is normal. No respiratory distress.      Breath sounds: Normal breath sounds. No wheezing.   Abdominal:      General: Abdomen is flat. Bowel sounds are normal. There is no distension.      Palpations: Abdomen is soft.      Tenderness: There is no abdominal tenderness.   Musculoskeletal:         General: No swelling or tenderness. Normal range of motion.   Skin:     General: Skin is warm and dry.      Capillary Refill: Capillary refill takes less than 2 seconds.   Neurological:      General: No focal deficit present.      Mental Status: She is alert and oriented to person, place, and time.   Psychiatric:         Mood and Affect: Mood normal.           Intake/Output last 3 Shifts:  I/O last 3 completed shifts:  In: 480 (11.2 mL/kg) [P.O.:480]  Out: 351 (8.2 mL/kg) [Urine:351 (0.2 mL/kg/hr)]  Weight: 42.9 kg     Relevant Results  Results for orders placed or performed during the hospital encounter of 05/30/24 (from the past 24 hour(s))   Digoxin   Result Value Ref Range    Digoxin  0.67 (L) " 0.80 - <2.00 ng/mL   Basic Metabolic Panel   Result Value Ref Range    Glucose 96 74 - 99 mg/dL    Sodium 134 (L) 136 - 145 mmol/L    Potassium 3.5 3.5 - 5.3 mmol/L    Chloride 102 98 - 107 mmol/L    Bicarbonate 24 21 - 32 mmol/L    Anion Gap 12 10 - 20 mmol/L    Urea Nitrogen 30 (H) 6 - 23 mg/dL    Creatinine 1.23 (H) 0.50 - 1.05 mg/dL    eGFR 40 (L) >60 mL/min/1.73m*2    Calcium 7.0 (L) 8.6 - 10.3 mg/dL   CBC   Result Value Ref Range    WBC 11.5 (H) 4.4 - 11.3 x10*3/uL    nRBC 0.0 0.0 - 0.0 /100 WBCs    RBC 3.42 (L) 4.00 - 5.20 x10*6/uL    Hemoglobin 10.8 (L) 12.0 - 16.0 g/dL    Hematocrit 32.4 (L) 36.0 - 46.0 %    MCV 95 80 - 100 fL    MCH 31.6 26.0 - 34.0 pg    MCHC 33.3 32.0 - 36.0 g/dL    RDW 13.3 11.5 - 14.5 %    Platelets 103 (L) 150 - 450 x10*3/uL       Imaging Results  XR chest 1 view   Final Result   Bilateral peribronchial thickening/bronchitis.   Signed by Manjeet Goddard MD          Medications:  apixaban, 2.5 mg, oral, BID  azithromycin, 500 mg, intravenous, q24h  cefTRIAXone, 2 g, intravenous, q24h  digoxin, 125 mcg, oral, Once per day on Sunday Tuesday Wednesday Thursday Saturday  [Held by provider] furosemide, 20 mg, oral, Daily  ipratropium-albuteroL, 3 mL, nebulization, TID  [Held by provider] losartan, 100 mg, oral, Daily  metoprolol succinate XL, 100 mg, oral, BID  mirtazapine, 7.5 mg, oral, Nightly  pramipexole, 0.125 mg, oral, Nightly  simvastatin, 20 mg, oral, Nightly       PRN medications: acetaminophen, ipratropium-albuteroL, ondansetron, oxybutynin, oxygen        Assessment/Plan   Principal Problem:    Pneumonia of lower lobe due to infectious organism, unspecified laterality    Pneumonia of the lower lobe  -Continue IV azithromycin/IV Rocephin  -Incentive spirometer  -As needed DuoNeb  -procalcitonin elevated     Afib with RVR resolved   -Continue patient metoprolol and digoxin for rate control  -Eliquis   -Cardio consulted, appreciate their recs         HTN  -Currently hypotensive   -Hold  home meds     CKD  -Creatinine stable     Systolic chf  - furosemide/ losartan held for now    Copd  -as needed duoneb     HLD  -continue home regimen and as above     DVT Prophylaxis:  Eliquis    Disposition:  Will try to DC tomorrow     Efren Nj, [facility]

## 2024-06-01 NOTE — PROGRESS NOTES
"Subjective Data:  C/o leg pain which improved after receiving pain medications  No chest pain or shortness of breath  Remains on supplemental oxygen    Telemetry review showed Afib rate controlled 80-90s    Overnight Events:    N/A     Objective Data:  Last Recorded Vitals:  Vitals:    05/31/24 2025 05/31/24 2039 05/31/24 2338 06/01/24 0319   BP: 113/57  119/52 117/67   BP Location: Left arm  Left arm Left arm   Patient Position: Lying  Lying Lying   Pulse:       Resp:       Temp: 36.8 °C (98.2 °F)  36.7 °C (98.1 °F) 36.7 °C (98 °F)   TempSrc: Temporal  Temporal Temporal   SpO2: 94% 93% 93% 94%   Weight:       Height:           Last Labs:  LABS:  CMP:  Results from last 7 days   Lab Units 06/01/24  0556 05/31/24  0749 05/30/24  1311   SODIUM mmol/L 134* 132* 131*   POTASSIUM mmol/L 3.5 3.9 4.3   CHLORIDE mmol/L 102 100 95*   CO2 mmol/L 24 27 22   ANION GAP mmol/L 12 9* 18   BUN mg/dL 30* 34* 38*   CREATININE mg/dL 1.23* 1.53* 1.82*   EGFR mL/min/1.73m*2 40* 31* 25*   ALBUMIN g/dL  --   --  4.1   ALT U/L  --   --  14   AST U/L  --   --  56*   BILIRUBIN TOTAL mg/dL  --   --  1.0     CBC:  Results from last 7 days   Lab Units 06/01/24  0556 05/31/24  0749 05/30/24  1311   WBC AUTO x10*3/uL 11.5* 12.2* 13.9*   HEMOGLOBIN g/dL 10.8* 10.7* 13.4   HEMATOCRIT % 32.4* 32.3* 41.1   PLATELETS AUTO x10*3/uL 103* 98* 112*   MCV fL 95 95 96     COAG:     ABO: No results found for: \"ABO\"  HEME/ENDO:     CARDIAC:   Results from last 7 days   Lab Units 05/31/24  0652 05/30/24  1311   BNP pg/mL 414* 826*     Recent Labs     04/20/18  0458   CHOL 103   LDLF 62   HDL 20.5*   TRIG 103         Last I/O:  I/O last 3 completed shifts:  In: 480 (11.2 mL/kg) [P.O.:480]  Out: 351 (8.2 mL/kg) [Urine:351 (0.2 mL/kg/hr)]  Weight: 42.9 kg     Past Cardiology Tests (Last 3 Years):  EKG:  Electrocardiogram, 12-lead PRN ACS symptoms 05/30/2024      ECG 12 lead 05/30/2024      ECG 12 lead 05/30/2024    Echo:  7/14/2022   1. The left ventricular " systolic function is normal with a 55-60% estimated ejection fraction.   2. Spectral Doppler shows an abnormal pattern of left ventricular diastolic filling.   3. The patient is in atrial fibrillation which may influence the estimate of left ventricular function and transvalvular flows.   4. The left atrium is severely dilated.   5. The right atrium is moderately dilated.   6. Mild to moderately elevated right ventricular systolic pressure.   7. There is mild to moderate tricuspid regurgitation.   8. There is mild aortic valve regurgitation.   9. The left ventricular cavity size is decreased.    3/4/2020   1. The left ventricular systolic function is low normal with a 50-55% estimated ejection fraction.   2. The left atrium is severely dilated.   3. Slightly elevated RVSP.   4. Compared with the prior exam from 4/1/2019 the patient was in atrial fibrillation wt reported LVEF mildly reduced at 45-50%. The LA was moderately severely dilated at that time. Overall no significant changes. ( this was markedly improved compared with LVEF from 4/2018 which was only 25%).   5. The patient is in atrial fibrillation which may influence the estimate of left ventricular function and transvalvular flows.    4/1/2019  CONCLUSIONS:   1. The left ventricular systolic function is mildly decreased with a 45-50% estimated ejection fraction.   2. Spectral Doppler shows an abnormal pattern of left ventricular diastolic filling.   3. There is no evidence of left ventricular hypertrophy.   4. The left atrium is moderate to severely dilated.   5. Absent A-wave on MV spectral Doppler tracing, consistent with atrial fibrillation.   6. Mildly elevated RVSP.   7. When this study is compared to prior echo 4/2018, LVEF has improved from 20% to 45-50%.   8. The patient is in atrial fibrillation which may influence the estimate of left ventricular function and transvalvular flows.    4/20/2018   1. The left ventricular systolic function is  "severely decreased with a 25% estimated ejection fraction.   2. Spectral Doppler shows an abnormal pattern of left ventricular diastolic filling.   3. There are elevated left atrial and left ventricular end diastolic pressures.   4. There is borderline eccentric left ventricular hypertrophy.   5. The left atrium is moderately dilated.   6. There is a moderate pleural effusion.   7. Moderately elevated right ventricular systolic pressure.   8. Moderately elevated pulmonary artery pressure.   9. Significan drop in EF.  10. The patient is in atrial fibrillation which may influence the estimate of left ventricular function and transvalvular flows.  11. There is global hypokinesis of the left ventricle with minor regional variations.    5/22/2017   1. The left ventricular systolic function is normal with a 60-65% estimated ejection fraction.   2. Spectral Doppler shows an impaired relaxation pattern of left ventricular diastolic filling.   3. There is mild aortic valve regurgitation.        Ejection Fractions:  No results found for: \"EF\"  Cath:  No results found for this or any previous visit from the past 1095 days.    Stress Test:  No results found for this or any previous visit from the past 1095 days.    Cardiac Imaging:  No results found for this or any previous visit from the past 1095 days.      Inpatient Medications:  Scheduled medications   Medication Dose Route Frequency    apixaban  2.5 mg oral BID    azithromycin  500 mg intravenous q24h    cefTRIAXone  2 g intravenous q24h    digoxin  125 mcg oral Once per day on Sunday Tuesday Wednesday Thursday Saturday    [Held by provider] furosemide  20 mg oral Daily    ipratropium-albuteroL  3 mL nebulization TID    [Held by provider] losartan  100 mg oral Daily    metoprolol succinate XL  100 mg oral BID    mirtazapine  7.5 mg oral Nightly    pramipexole  0.125 mg oral Nightly    simvastatin  20 mg oral Nightly     PRN medications   Medication    acetaminophen    " ipratropium-albuteroL    ondansetron    oxybutynin    oxygen     Continuous Medications   Medication Dose Last Rate    sodium chloride 0.9%  100 mL/hr 100 mL/hr (05/31/24 2052)       Physical Exam:  GENERAL: alert, cooperative, pleasant, in no acute distress  SKIN: warm, dry  NECK: no JVD  CARDIAC: Irregularly irregular rate controlled   CHEST: Diminished breath sounds on supplemental oxygen via 4L NC  ABDOMEN: soft, nondistended  EXTREMITIES: no lower extremity edema  NEURO: Alert and oriented x 3.  Grossly normal.  Moves all 4 extremities.      Assessment/Plan   Patient currently is asymptomatic but does endorse cough and SOB. She is largely rate controlled at this time with HR between 95 and 115 bpm. There are no signs of decompensated HF on exam. In fact, she looks slightly hypovolemic on exam.     #Atrial fibrillation-Rates are now acceptable.   #HFpEF- diuretics currently on hold  #CKD  #HTN  #Pneumonia- on Antibiotics. Management per primary team       Recommendations   -Continue Metoprolol and Digoxin   -Continue Eliquis for anticoagulation   -Wean oxygen as tolerated     Will need follow up with Dr. Thomas at discharge     Code Status:  Full Code      Dakota Koch, APRN-CNP

## 2024-06-02 LAB
DIGOXIN SERPL-MCNC: 0.72 NG/ML (ref 0.8–?)
HOLD SPECIMEN: NORMAL
TSH SERPL-ACNC: 1.8 MIU/L (ref 0.44–3.98)

## 2024-06-02 PROCEDURE — 94640 AIRWAY INHALATION TREATMENT: CPT | Mod: MUE

## 2024-06-02 PROCEDURE — 2500000005 HC RX 250 GENERAL PHARMACY W/O HCPCS: Performed by: INTERNAL MEDICINE

## 2024-06-02 PROCEDURE — 97165 OT EVAL LOW COMPLEX 30 MIN: CPT | Mod: GO

## 2024-06-02 PROCEDURE — 2500000001 HC RX 250 WO HCPCS SELF ADMINISTERED DRUGS (ALT 637 FOR MEDICARE OP): Performed by: NURSE PRACTITIONER

## 2024-06-02 PROCEDURE — 2500000004 HC RX 250 GENERAL PHARMACY W/ HCPCS (ALT 636 FOR OP/ED): Performed by: INTERNAL MEDICINE

## 2024-06-02 PROCEDURE — 9420000001 HC RT PATIENT EDUCATION 5 MIN

## 2024-06-02 PROCEDURE — 36415 COLL VENOUS BLD VENIPUNCTURE: CPT | Performed by: NURSE PRACTITIONER

## 2024-06-02 PROCEDURE — 2500000004 HC RX 250 GENERAL PHARMACY W/ HCPCS (ALT 636 FOR OP/ED): Performed by: NURSE PRACTITIONER

## 2024-06-02 PROCEDURE — 94668 MNPJ CHEST WALL SBSQ: CPT

## 2024-06-02 PROCEDURE — 1200000002 HC GENERAL ROOM WITH TELEMETRY DAILY

## 2024-06-02 PROCEDURE — 2500000002 HC RX 250 W HCPCS SELF ADMINISTERED DRUGS (ALT 637 FOR MEDICARE OP, ALT 636 FOR OP/ED): Performed by: INTERNAL MEDICINE

## 2024-06-02 PROCEDURE — 99233 SBSQ HOSP IP/OBS HIGH 50: CPT | Performed by: INTERNAL MEDICINE

## 2024-06-02 PROCEDURE — 2500000001 HC RX 250 WO HCPCS SELF ADMINISTERED DRUGS (ALT 637 FOR MEDICARE OP): Performed by: INTERNAL MEDICINE

## 2024-06-02 PROCEDURE — 99233 SBSQ HOSP IP/OBS HIGH 50: CPT | Performed by: NURSE PRACTITIONER

## 2024-06-02 PROCEDURE — 84443 ASSAY THYROID STIM HORMONE: CPT | Performed by: NURSE PRACTITIONER

## 2024-06-02 PROCEDURE — 80162 ASSAY OF DIGOXIN TOTAL: CPT | Performed by: NURSE PRACTITIONER

## 2024-06-02 RX ORDER — DILTIAZEM HYDROCHLORIDE 30 MG/1
30 TABLET, FILM COATED ORAL EVERY 6 HOURS
Status: DISCONTINUED | OUTPATIENT
Start: 2024-06-02 | End: 2024-06-03

## 2024-06-02 RX ORDER — QUETIAPINE FUMARATE 50 MG/1
50 TABLET, FILM COATED ORAL ONCE
Status: COMPLETED | OUTPATIENT
Start: 2024-06-02 | End: 2024-06-02

## 2024-06-02 RX ORDER — AMOXICILLIN AND CLAVULANATE POTASSIUM 500; 125 MG/1; MG/1
1 TABLET, FILM COATED ORAL 2 TIMES DAILY
Status: DISCONTINUED | OUTPATIENT
Start: 2024-06-02 | End: 2024-06-03 | Stop reason: HOSPADM

## 2024-06-02 RX ORDER — AZITHROMYCIN 500 MG/1
500 TABLET, FILM COATED ORAL
Status: DISCONTINUED | OUTPATIENT
Start: 2024-06-03 | End: 2024-06-03 | Stop reason: HOSPADM

## 2024-06-02 RX ORDER — METOPROLOL TARTRATE 1 MG/ML
5 INJECTION, SOLUTION INTRAVENOUS ONCE
Status: COMPLETED | OUTPATIENT
Start: 2024-06-02 | End: 2024-06-02

## 2024-06-02 RX ORDER — METOPROLOL SUCCINATE 50 MG/1
150 TABLET, EXTENDED RELEASE ORAL 2 TIMES DAILY
Status: DISCONTINUED | OUTPATIENT
Start: 2024-06-02 | End: 2024-06-03 | Stop reason: HOSPADM

## 2024-06-02 RX ADMIN — APIXABAN 2.5 MG: 2.5 TABLET, FILM COATED ORAL at 21:27

## 2024-06-02 RX ADMIN — IPRATROPIUM BROMIDE AND ALBUTEROL SULFATE 3 ML: 2.5; .5 SOLUTION RESPIRATORY (INHALATION) at 15:19

## 2024-06-02 RX ADMIN — METOPROLOL SUCCINATE 150 MG: 50 TABLET, EXTENDED RELEASE ORAL at 09:11

## 2024-06-02 RX ADMIN — APIXABAN 2.5 MG: 2.5 TABLET, FILM COATED ORAL at 09:11

## 2024-06-02 RX ADMIN — IPRATROPIUM BROMIDE AND ALBUTEROL SULFATE 3 ML: 2.5; .5 SOLUTION RESPIRATORY (INHALATION) at 21:03

## 2024-06-02 RX ADMIN — Medication 3 L/MIN: at 15:19

## 2024-06-02 RX ADMIN — SODIUM CHLORIDE 500 ML: 9 INJECTION, SOLUTION INTRAVENOUS at 11:18

## 2024-06-02 RX ADMIN — MIRTAZAPINE 7.5 MG: 15 TABLET, FILM COATED ORAL at 21:27

## 2024-06-02 RX ADMIN — IPRATROPIUM BROMIDE AND ALBUTEROL SULFATE 3 ML: 2.5; .5 SOLUTION RESPIRATORY (INHALATION) at 08:01

## 2024-06-02 RX ADMIN — AMOXICILLIN AND CLAVULANATE POTASSIUM 1 TABLET: 500; 125 TABLET, FILM COATED ORAL at 22:11

## 2024-06-02 RX ADMIN — METOPROLOL TARTRATE 5 MG: 5 INJECTION INTRAVENOUS at 02:16

## 2024-06-02 RX ADMIN — DIGOXIN 125 MCG: 125 TABLET ORAL at 09:10

## 2024-06-02 RX ADMIN — PRAMIPEXOLE DIHYDROCHLORIDE 0.12 MG: 0.25 TABLET ORAL at 21:27

## 2024-06-02 RX ADMIN — DILTIAZEM HYDROCHLORIDE 30 MG: 30 TABLET, FILM COATED ORAL at 21:26

## 2024-06-02 RX ADMIN — Medication 3 L/MIN: at 08:01

## 2024-06-02 RX ADMIN — DILTIAZEM HYDROCHLORIDE 30 MG: 30 TABLET, FILM COATED ORAL at 16:40

## 2024-06-02 RX ADMIN — AZITHROMYCIN MONOHYDRATE 500 MG: 500 INJECTION, POWDER, LYOPHILIZED, FOR SOLUTION INTRAVENOUS at 14:59

## 2024-06-02 RX ADMIN — METOPROLOL SUCCINATE 150 MG: 50 TABLET, EXTENDED RELEASE ORAL at 21:26

## 2024-06-02 RX ADMIN — QUETIAPINE FUMARATE 50 MG: 50 TABLET ORAL at 17:20

## 2024-06-02 ASSESSMENT — COGNITIVE AND FUNCTIONAL STATUS - GENERAL
MOVING TO AND FROM BED TO CHAIR: A LOT
EATING MEALS: A LITTLE
TOILETING: A LOT
TURNING FROM BACK TO SIDE WHILE IN FLAT BAD: A LOT
HELP NEEDED FOR BATHING: A LOT
CLIMB 3 TO 5 STEPS WITH RAILING: TOTAL
DRESSING REGULAR UPPER BODY CLOTHING: A LITTLE
WALKING IN HOSPITAL ROOM: TOTAL
HELP NEEDED FOR BATHING: A LOT
DRESSING REGULAR LOWER BODY CLOTHING: A LOT
MOVING FROM LYING ON BACK TO SITTING ON SIDE OF FLAT BED WITH BEDRAILS: A LOT
MOBILITY SCORE: 10
DRESSING REGULAR LOWER BODY CLOTHING: A LOT
DAILY ACTIVITIY SCORE: 14
TOILETING: A LOT
DRESSING REGULAR UPPER BODY CLOTHING: A LOT
STANDING UP FROM CHAIR USING ARMS: A LOT
PERSONAL GROOMING: A LITTLE
PERSONAL GROOMING: A LITTLE
DAILY ACTIVITIY SCORE: 16

## 2024-06-02 ASSESSMENT — PAIN SCALES - GENERAL: PAINLEVEL_OUTOF10: 0 - NO PAIN

## 2024-06-02 ASSESSMENT — ACTIVITIES OF DAILY LIVING (ADL): ADL_ASSISTANCE: INDEPENDENT

## 2024-06-02 ASSESSMENT — PAIN - FUNCTIONAL ASSESSMENT: PAIN_FUNCTIONAL_ASSESSMENT: 0-10

## 2024-06-02 NOTE — PROGRESS NOTES
Occupational Therapy    Evaluation    Patient Name: Constance Apodaca  MRN: [id number]  Today's Date: 6/2/2024  Time Calculation  Start Time: 1056  Stop Time: 1115  Time Calculation (min): 19 min        Assessment:  OT Assessment: Pt presents to occupational therapy evaluation with weakness, risk for falls, and inability to care for self at baseline functional capacity warranting MOD Intensity OT this admission and at d/c.  Prognosis: Good  Barriers to Discharge: None  Evaluation/Treatment Tolerance: Patient tolerated treatment well  Medical Staff Made Aware: Yes  End of Session Communication: Bedside nurse  End of Session Patient Position: Bed, 3 rail up, Alarm on  OT Assessment Results: Decreased ADL status, Decreased upper extremity strength, Decreased functional mobility  Prognosis: Good  Barriers to Discharge: None  Evaluation/Treatment Tolerance: Patient tolerated treatment well  Medical Staff Made Aware: Yes  Strengths: Premorbid level of function, Attitude of self, Support of extended family/friends  Barriers to Participation: Comorbidities  Plan:  Treatment Interventions: ADL retraining, Functional transfer training, UE strengthening/ROM  OT Frequency: 3 times per week  OT Discharge Recommendations: Moderate intensity level of continued care  Equipment Recommended upon Discharge: Wheeled walker  OT Recommended Transfer Status: Minimal assist, Assist of 1  OT - OK to Discharge: Yes (Per POC)  Treatment Interventions: ADL retraining, Functional transfer training, UE strengthening/ROM    Subjective   Current Problem:  1. Pneumonia of lower lobe due to infectious organism, unspecified laterality        2. Congestive heart failure, unspecified HF chronicity, unspecified heart failure type (Multi)        3. Atrial fibrillation with RVR (Multi)          General:  General  Reason for Referral: Presented to ED with SOB + cough. XR confirms pneumonia of lower lobe. Afib with RVR this admission.  Referred By: Dr. Ondina Lipscomb  Medical History Relevant to Rehab: HTN, CKD, CHF, COPD  Family/Caregiver Present: No  Prior to Session Communication: Bedside nurse  Patient Position Received: Bed, 3 rail up, Alarm on  Preferred Learning Style: auditory, verbal  General Comment: Pt pleasant, cooperative and agreeable with OT evaluation.  Precautions:  Medical Precautions: Fall precautions  Vital Signs:  Heart Rate: (!) 139 (while sitting EOB)  Heart Rate Source: Monitor  Pain:  Pain Assessment  Pain Assessment: 0-10  Pain Score: 0 - No pain    Objective   Cognition:  Overall Cognitive Status: Impaired  Orientation Level: Disoriented to place  Following Commands: Follows one step commands with increased time  Safety/Judgement: Exceptions to WFL  Insight: Mild  Impulsive: Mildly  Processing Speed: Delayed           Home Living:  Type of Home: House  Lives With: Adult children (Dtr with Alzheimer's and son in law)  Home Layout: One level  Home Access: Stairs to enter with rails  Entrance Stairs-Number of Steps: 3  Bathroom Shower/Tub: Tub/shower unit  Prior Function:  Level of [place]: Independent with ADLs and functional transfers, Needs assistance with homemaking  Receives Help From:  (Son in law + dtr)  ADL Assistance: Independent  Homemaking Assistance: Needs assistance  Ambulatory Assistance: Independent (Ambulates with ww)  IADL History:  Homemaking Responsibilities: No (Son in law does IADLs)  ADL:  Grooming Assistance: Stand by  UE Dressing Assistance: Minimal  UE Dressing Deficit: Fasteners  LE Dressing Assistance: Maximal  LE Dressing Deficit: Don/doff R sock, Don/doff L sock  Activity Tolerance:     Bed Mobility/Transfers: Bed Mobility  Bed Mobility: Yes  Bed Mobility 1  Bed Mobility 1: Supine to sitting, Sitting to supine  Level of Assistance 1: Contact guard    Transfers  Transfer: Yes  Transfer 1  Transfer From 1: Sit to  Transfer to 1: Stand  Technique 1: Sit to stand  Transfer Device 1: Walker  Transfer Level of Assistance 1:  Minimum assistance      Functional Mobility:  Functional Mobility  Functional Mobility Performed: Yes (Pt able to takes steps along bedside with use of ww.)  Sitting Balance:  Static Sitting Balance  Static Sitting-Balance Support: Bilateral upper extremity supported, Feet supported  Static Sitting-Level of Assistance: Close supervision  Dynamic Sitting Balance  Dynamic Sitting-Balance Support: Bilateral upper extremity supported, Feet supported  Dynamic Sitting-Balance: Forward lean  Dynamic Sitting-Comments: CGA  Standing Balance:  Static Standing Balance  Static Standing-Balance Support: Bilateral upper extremity supported  Static Standing-Level of Assistance: Minimum assistance  Dynamic Standing Balance  Dynamic Standing-Balance Support: Bilateral upper extremity supported  Dynamic Standing-Balance: Forward lean, Turning  Dynamic Standing-Comments: Min A   Modalities:     Vision:Vision - Basic Assessment  Current Vision: No visual deficits  Sensation:  Light Touch: No apparent deficits  Strength:  Strength Comments: BUE appears 4/5  Perception:     Coordination:  Movements are Fluid and Coordinated: Yes   Hand Function:  Gross Grasp: Functional  Coordination: Functional      Outcome Measures:[facility] Daily Activity  Putting on and taking off regular lower body clothing: A lot  Bathing (including washing, rinsing, drying): A lot  Putting on and taking off regular upper body clothing: A little  Toileting, which includes using toilet, bedpan or urinal: A lot  Taking care of personal grooming such as brushing teeth: A little  Eating Meals: None  Daily Activity - Total Score: 16        Education Documentation  Body Mechanics, taught by Kylah Stewart OT at 6/2/2024  1:16 PM.  Learner: Patient  Readiness: Eager  Method: Explanation  Response: Verbalizes Understanding    ADL Training, taught by Kylah Stewart OT at 6/2/2024  1:16 PM.  Learner: Patient  Readiness: Eager  Method: Explanation  Response:  Verbalizes Understanding    Education Comments  No comments found.        OP EDUCATION:  Education  Individual(s) Educated: Patient  Education Provided: Anatomy & Physiology  Risk and Benefits Discussed with Patient/Caregiver/Other: yes  Patient/Caregiver Demonstrated Understanding: yes  Plan of Care Discussed and Agreed Upon: yes  Patient Response to Education: Patient/Caregiver Verbalized Understanding of Information    Goals:  Encounter Problems       Encounter Problems (Active)       ADLs       Patient will perform UB and LB bathing with stand by assist level of assistance and raised toilet seat.       Start:  06/02/24    Expected End:  06/16/24            Patient with complete upper body dressing with supervision level of assistance donning and doffing all UE clothes with no adaptive equipment while edge of bed        Start:  06/02/24    Expected End:  06/16/24            Patient with complete lower body dressing with contact guard assist level of assistance donning and doffing all LE clothes  with PRN adaptive equipment while edge of bed        Start:  06/02/24    Expected End:  06/16/24            Patient will complete daily grooming tasks brushing teeth and washing face with set-up level of assistance and PRN adaptive equipment while edge of bed  and standing.       Start:  06/02/24    Expected End:  06/16/24            Patient will complete toileting including hygiene clothing management/hygiene with supervision level of assistance and raised toilet seat and grab bars.       Start:  06/02/24    Expected End:  06/16/24               MOBILITY       Patient will perform Functional mobility with supervision level of assistance and least restrictive device in order to improve safety and functional mobility.       Start:  06/02/24    Expected End:  06/16/24               TRANSFERS       Patient will perform bed mobility supervision level of assistance and bed rails and draw sheet in order to improve safety and  independence with mobility       Start:  06/02/24    Expected End:  06/16/24            Patient will complete functional transfer to BSC/toilet with least restrictive device with supervision level of assistance.       Start:  06/02/24    Expected End:  06/16/24            Patient will complete sit to stand transfer with supervision level of assistance and least restrictive device in order to improve safety and prepare for out of bed mobility.       Start:  06/02/24    Expected End:  06/16/24

## 2024-06-02 NOTE — PROGRESS NOTES
"Subjective Data:  Remains in afib RVR -150s.     Overnight Events:    N/A     Objective Data:  Last Recorded Vitals:  Vitals:    06/02/24 0733 06/02/24 0801 06/02/24 1056 06/02/24 1142   BP: 151/80   148/71   BP Location: Left arm   Right arm   Patient Position: Lying   Lying   Pulse: (!) 119  (!) 139    Resp: 18      Temp: 36.6 °C (97.9 °F)   36.1 °C (96.9 °F)   TempSrc: Temporal   Temporal   SpO2: 100% 95%  97%   Weight:       Height:           Last Labs:  LABS:  CMP:  Results from last 7 days   Lab Units 06/01/24  0556 05/31/24  0749 05/30/24  1311   SODIUM mmol/L 134* 132* 131*   POTASSIUM mmol/L 3.5 3.9 4.3   CHLORIDE mmol/L 102 100 95*   CO2 mmol/L 24 27 22   ANION GAP mmol/L 12 9* 18   BUN mg/dL 30* 34* 38*   CREATININE mg/dL 1.23* 1.53* 1.82*   EGFR mL/min/1.73m*2 40* 31* 25*   ALBUMIN g/dL  --   --  4.1   ALT U/L  --   --  14   AST U/L  --   --  56*   BILIRUBIN TOTAL mg/dL  --   --  1.0     CBC:  Results from last 7 days   Lab Units 06/01/24  0556 05/31/24  0749 05/30/24  1311   WBC AUTO x10*3/uL 11.5* 12.2* 13.9*   HEMOGLOBIN g/dL 10.8* 10.7* 13.4   HEMATOCRIT % 32.4* 32.3* 41.1   PLATELETS AUTO x10*3/uL 103* 98* 112*   MCV fL 95 95 96     COAG:     ABO: No results found for: \"ABO\"  HEME/ENDO:     CARDIAC:   Results from last 7 days   Lab Units 05/31/24  0652 05/30/24  1311   BNP pg/mL 414* 826*     Recent Labs     04/20/18  0458   CHOL 103   LDLF 62   HDL 20.5*   TRIG 103         Last I/O:  I/O last 3 completed shifts:  In: - (0 mL/kg)   Out: 475 (11.1 mL/kg) [Urine:475 (0.3 mL/kg/hr)]  Weight: 42.9 kg     Past Cardiology Tests (Last 3 Years):  EKG:  Electrocardiogram, 12-lead PRN ACS symptoms 05/30/2024      ECG 12 lead 05/30/2024      ECG 12 lead 05/30/2024    Echo:  7/14/2022   1. The left ventricular systolic function is normal with a 55-60% estimated ejection fraction.   2. Spectral Doppler shows an abnormal pattern of left ventricular diastolic filling.   3. The patient is in atrial " fibrillation which may influence the estimate of left ventricular function and transvalvular flows.   4. The left atrium is severely dilated.   5. The right atrium is moderately dilated.   6. Mild to moderately elevated right ventricular systolic pressure.   7. There is mild to moderate tricuspid regurgitation.   8. There is mild aortic valve regurgitation.   9. The left ventricular cavity size is decreased.    3/4/2020   1. The left ventricular systolic function is low normal with a 50-55% estimated ejection fraction.   2. The left atrium is severely dilated.   3. Slightly elevated RVSP.   4. Compared with the prior exam from 4/1/2019 the patient was in atrial fibrillation wt reported LVEF mildly reduced at 45-50%. The LA was moderately severely dilated at that time. Overall no significant changes. ( this was markedly improved compared with LVEF from 4/2018 which was only 25%).   5. The patient is in atrial fibrillation which may influence the estimate of left ventricular function and transvalvular flows.    4/1/2019  CONCLUSIONS:   1. The left ventricular systolic function is mildly decreased with a 45-50% estimated ejection fraction.   2. Spectral Doppler shows an abnormal pattern of left ventricular diastolic filling.   3. There is no evidence of left ventricular hypertrophy.   4. The left atrium is moderate to severely dilated.   5. Absent A-wave on MV spectral Doppler tracing, consistent with atrial fibrillation.   6. Mildly elevated RVSP.   7. When this study is compared to prior echo 4/2018, LVEF has improved from 20% to 45-50%.   8. The patient is in atrial fibrillation which may influence the estimate of left ventricular function and transvalvular flows.    4/20/2018   1. The left ventricular systolic function is severely decreased with a 25% estimated ejection fraction.   2. Spectral Doppler shows an abnormal pattern of left ventricular diastolic filling.   3. There are elevated left atrial and left  "ventricular end diastolic pressures.   4. There is borderline eccentric left ventricular hypertrophy.   5. The left atrium is moderately dilated.   6. There is a moderate pleural effusion.   7. Moderately elevated right ventricular systolic pressure.   8. Moderately elevated pulmonary artery pressure.   9. Significan drop in EF.  10. The patient is in atrial fibrillation which may influence the estimate of left ventricular function and transvalvular flows.  11. There is global hypokinesis of the left ventricle with minor regional variations.    5/22/2017   1. The left ventricular systolic function is normal with a 60-65% estimated ejection fraction.   2. Spectral Doppler shows an impaired relaxation pattern of left ventricular diastolic filling.   3. There is mild aortic valve regurgitation.        Ejection Fractions:  No results found for: \"EF\"  Cath:  No results found for this or any previous visit from the past 1095 days.    Stress Test:  No results found for this or any previous visit from the past 1095 days.    Cardiac Imaging:  No results found for this or any previous visit from the past 1095 days.      Inpatient Medications:  Scheduled medications   Medication Dose Route Frequency    apixaban  2.5 mg oral BID    azithromycin  500 mg intravenous q24h    cefTRIAXone  2 g intravenous q24h    digoxin  125 mcg oral Once per day on Sunday Tuesday Wednesday Thursday Saturday    [Held by provider] furosemide  20 mg oral Daily    ipratropium-albuteroL  3 mL nebulization TID    [Held by provider] losartan  100 mg oral Daily    metoprolol succinate XL  150 mg oral BID    mirtazapine  7.5 mg oral Nightly    pramipexole  0.125 mg oral Nightly    simvastatin  20 mg oral Nightly     PRN medications   Medication    acetaminophen    ipratropium-albuteroL    ondansetron    oxybutynin    oxygen     Continuous Medications   Medication Dose Last Rate    sodium chloride 0.9%  100 mL/hr 100 mL/hr (05/31/24 2052)       Physical " Exam:  GENERAL: alert, cooperative, pleasant, in no acute distress  SKIN: warm, dry  NECK: no JVD  CARDIAC: Irregularly irregular tachycardic  CHEST: Diminished breath sounds on supplemental oxygen via 4L NC  ABDOMEN: soft, nondistended  EXTREMITIES: no lower extremity edema  NEURO: Alert and oriented x 3.  Grossly normal.  Moves all 4 extremities.      Assessment/Plan   Patient currently is asymptomatic but does endorse cough and SOB. She is largely rate controlled at this time with HR between 95 and 115 bpm. There are no signs of decompensated HF on exam. In fact, she looks slightly hypovolemic on exam.     #Atrial fibrillation- Increased metoprolol to 150 mg BID. Rates improved but remain elevated    #HFpEF- diuretics currently on hold  #CKD  #HTN  #Pneumonia- on Antibiotics. Management per primary team       Recommendations   -Continue  Metoprolol and Digoxin  -Add diltiazem IR 30 mg every 6 hours with hold parameters   -Continue Eliquis for anticoagulation   -Wean oxygen as tolerated     Will need follow up with Dr. Thomas at discharge     Code Status:  Full Code      Dakota Koch, APRN-CNP

## 2024-06-02 NOTE — PROGRESS NOTES
"Constance Apodaca is a 89 y.o. female on day 3 of admission presenting with Pneumonia of lower lobe due to infectious organism, unspecified laterality.    Subjective   Appears comfortable. HR still pretty high. Will bolus again. Waiting on PT/OT.        Objective     VITALS  Blood pressure 151/80, pulse (!) 119, temperature 36.6 °C (97.9 °F), temperature source Temporal, resp. rate 18, height 1.6 m (5' 3\"), weight (!) 42.9 kg (94 lb 9.6 oz), SpO2 95%.  Physical Exam  Vitals and nursing note reviewed.   Constitutional:       General: She is not in acute distress.     Appearance: Normal appearance. She is underweight.   HENT:      Head: Normocephalic and atraumatic.   Cardiovascular:      Rate and Rhythm: Normal rate and regular rhythm.      Heart sounds: Normal heart sounds.   Pulmonary:      Effort: Pulmonary effort is normal. No respiratory distress.      Breath sounds: Normal breath sounds. No wheezing.   Abdominal:      General: Abdomen is flat. Bowel sounds are normal. There is no distension.      Palpations: Abdomen is soft.      Tenderness: There is no abdominal tenderness.   Musculoskeletal:         General: No swelling or tenderness. Normal range of motion.   Skin:     General: Skin is warm and dry.      Capillary Refill: Capillary refill takes less than 2 seconds.   Neurological:      General: No focal deficit present.      Mental Status: She is alert and oriented to person, place, and time.   Psychiatric:         Mood and Affect: Mood normal.           Intake/Output last 3 Shifts:  I/O last 3 completed shifts:  In: - (0 mL/kg)   Out: 475 (11.1 mL/kg) [Urine:475 (0.3 mL/kg/hr)]  Weight: 42.9 kg     Relevant Results  Results for orders placed or performed during the hospital encounter of 05/30/24 (from the past 24 hour(s))   Digoxin   Result Value Ref Range    Digoxin  0.72 (L) 0.80 - <2.00 ng/mL       Imaging Results  XR chest 1 view   Final Result   Bilateral peribronchial thickening/bronchitis.   Signed by Manjeet " MD Rupesh          Medications:  apixaban, 2.5 mg, oral, BID  azithromycin, 500 mg, intravenous, q24h  cefTRIAXone, 2 g, intravenous, q24h  digoxin, 125 mcg, oral, Once per day on Sunday Tuesday Wednesday Thursday Saturday  [Held by provider] furosemide, 20 mg, oral, Daily  ipratropium-albuteroL, 3 mL, nebulization, TID  [Held by provider] losartan, 100 mg, oral, Daily  metoprolol succinate XL, 150 mg, oral, BID  mirtazapine, 7.5 mg, oral, Nightly  pramipexole, 0.125 mg, oral, Nightly  simvastatin, 20 mg, oral, Nightly  sodium chloride, 500 mL, intravenous, Once       PRN medications: acetaminophen, ipratropium-albuteroL, ondansetron, oxybutynin, oxygen        Assessment/Plan   Principal Problem:    Pneumonia of lower lobe due to infectious organism, unspecified laterality    Pneumonia of the lower lobe  -Continue IV azithromycin/IV Rocephin  -Incentive spirometer  -As needed DuoNeb  -procalcitonin elevated     Afib with RVR still tachy this AM    -Continue patient metoprolol and digoxin for rate control  -Eliquis   -Cardio consulted, appreciate their recs         HTN  -Currently hypotensive   -Hold home meds     CKD  -Creatinine stable     Systolic chf  - furosemide/ losartan held for now    Copd  -as needed duoneb     HLD  -continue home regimen and as above     DVT Prophylaxis:  Eliquis    Disposition:  Will try to DC tomorrow still requiring a bit of O2 and HR not well controlled yet    Efren Nj, DO [facility] Medicine

## 2024-06-02 NOTE — PROGRESS NOTES
Care Coordinator Note:    TCC spoke with patients son Osmar and Dtr Claudia regarding dc planning. OT rec MOD. PT eval is pending. SNF list provided.  [facility] is [facility] [place]. Referral sent by CNC.     TCC/LUCRECIA to follow up with Son Osmar tomorrow regarding SNF acceptance and PT recs. Then family will decide between SNF vs [facility] PT OT.     Will need auth.     Latisha Reno [facility]      06/02/24 1331   Discharge Planning   Patient expects to be discharged to: NEW SNF vs [facility] PT OT and home with son

## 2024-06-03 VITALS
WEIGHT: 94.6 LBS | TEMPERATURE: 97.7 F | BODY MASS INDEX: 16.76 KG/M2 | HEART RATE: 64 BPM | HEIGHT: 63 IN | OXYGEN SATURATION: 94 % | RESPIRATION RATE: 16 BRPM | SYSTOLIC BLOOD PRESSURE: 123 MMHG | DIASTOLIC BLOOD PRESSURE: 65 MMHG

## 2024-06-03 LAB
BACTERIA BLD CULT: NORMAL
BACTERIA BLD CULT: NORMAL

## 2024-06-03 PROCEDURE — 2500000001 HC RX 250 WO HCPCS SELF ADMINISTERED DRUGS (ALT 637 FOR MEDICARE OP): Performed by: INTERNAL MEDICINE

## 2024-06-03 PROCEDURE — 2500000002 HC RX 250 W HCPCS SELF ADMINISTERED DRUGS (ALT 637 FOR MEDICARE OP, ALT 636 FOR OP/ED): Mod: MUE | Performed by: INTERNAL MEDICINE

## 2024-06-03 PROCEDURE — 94640 AIRWAY INHALATION TREATMENT: CPT

## 2024-06-03 PROCEDURE — 99232 SBSQ HOSP IP/OBS MODERATE 35: CPT

## 2024-06-03 PROCEDURE — 2500000001 HC RX 250 WO HCPCS SELF ADMINISTERED DRUGS (ALT 637 FOR MEDICARE OP): Performed by: NURSE PRACTITIONER

## 2024-06-03 PROCEDURE — 97161 PT EVAL LOW COMPLEX 20 MIN: CPT | Mod: GP

## 2024-06-03 PROCEDURE — 97530 THERAPEUTIC ACTIVITIES: CPT | Mod: GP

## 2024-06-03 PROCEDURE — 99239 HOSP IP/OBS DSCHRG MGMT >30: CPT | Performed by: INTERNAL MEDICINE

## 2024-06-03 RX ORDER — AMOXICILLIN AND CLAVULANATE POTASSIUM 500; 125 MG/1; MG/1
1 TABLET, FILM COATED ORAL 2 TIMES DAILY
Qty: 6 TABLET | Refills: 0 | Status: SHIPPED | OUTPATIENT
Start: 2024-06-03 | End: 2024-06-06

## 2024-06-03 RX ORDER — AZITHROMYCIN 500 MG/1
500 TABLET, FILM COATED ORAL
Qty: 2 TABLET | Refills: 0 | Status: SHIPPED | OUTPATIENT
Start: 2024-06-04 | End: 2024-06-06

## 2024-06-03 RX ADMIN — AZITHROMYCIN DIHYDRATE 500 MG: 500 TABLET ORAL at 09:07

## 2024-06-03 RX ADMIN — IPRATROPIUM BROMIDE AND ALBUTEROL SULFATE 3 ML: 2.5; .5 SOLUTION RESPIRATORY (INHALATION) at 08:36

## 2024-06-03 RX ADMIN — DILTIAZEM HYDROCHLORIDE 30 MG: 30 TABLET, FILM COATED ORAL at 14:51

## 2024-06-03 RX ADMIN — DILTIAZEM HYDROCHLORIDE 30 MG: 30 TABLET, FILM COATED ORAL at 04:30

## 2024-06-03 RX ADMIN — METOPROLOL SUCCINATE 150 MG: 50 TABLET, EXTENDED RELEASE ORAL at 09:06

## 2024-06-03 RX ADMIN — AMOXICILLIN AND CLAVULANATE POTASSIUM 1 TABLET: 500; 125 TABLET, FILM COATED ORAL at 09:06

## 2024-06-03 RX ADMIN — APIXABAN 2.5 MG: 2.5 TABLET, FILM COATED ORAL at 09:06

## 2024-06-03 RX ADMIN — DILTIAZEM HYDROCHLORIDE 30 MG: 30 TABLET, FILM COATED ORAL at 09:06

## 2024-06-03 ASSESSMENT — PAIN SCALES - GENERAL
PAINLEVEL_OUTOF10: 0 - NO PAIN

## 2024-06-03 ASSESSMENT — COGNITIVE AND FUNCTIONAL STATUS - GENERAL
WALKING IN HOSPITAL ROOM: A LOT
MOVING FROM LYING ON BACK TO SITTING ON SIDE OF FLAT BED WITH BEDRAILS: A LOT
TOILETING: TOTAL
TURNING FROM BACK TO SIDE WHILE IN FLAT BAD: TOTAL
MOVING FROM LYING ON BACK TO SITTING ON SIDE OF FLAT BED WITH BEDRAILS: TOTAL
DAILY ACTIVITIY SCORE: 6
EATING MEALS: TOTAL
MOVING TO AND FROM BED TO CHAIR: TOTAL
MOBILITY SCORE: 6
TURNING FROM BACK TO SIDE WHILE IN FLAT BAD: A LOT
MOVING TO AND FROM BED TO CHAIR: A LOT
HELP NEEDED FOR BATHING: TOTAL
PERSONAL GROOMING: TOTAL
CLIMB 3 TO 5 STEPS WITH RAILING: TOTAL
DRESSING REGULAR UPPER BODY CLOTHING: TOTAL
CLIMB 3 TO 5 STEPS WITH RAILING: TOTAL
STANDING UP FROM CHAIR USING ARMS: A LOT
WALKING IN HOSPITAL ROOM: TOTAL
STANDING UP FROM CHAIR USING ARMS: TOTAL
DRESSING REGULAR LOWER BODY CLOTHING: TOTAL
MOBILITY SCORE: 11

## 2024-06-03 ASSESSMENT — PAIN - FUNCTIONAL ASSESSMENT
PAIN_FUNCTIONAL_ASSESSMENT: 0-10

## 2024-06-03 ASSESSMENT — ACTIVITIES OF DAILY LIVING (ADL): ADL_ASSISTANCE: INDEPENDENT

## 2024-06-03 NOTE — CARE PLAN
Problem: Skin  Goal: Promote/optimize nutrition  6/3/2024 0318 by Lianne Chance RN  Flowsheets (Taken 6/3/2024 0318)  Promote/optimize nutrition:   Assist with feeding   Monitor/record intake including meals   Offer water/supplements/favorite foods  6/3/2024 0307 by Lianne Chance RN  Outcome: Progressing

## 2024-06-03 NOTE — NURSING NOTE
Upon arrival at the patients bedside the patient was yelling non sensical verbiage into the hallway. The patient was AO+2 confused about the situation, and place. Head to toe assessment, findings recorded, the patient followed all commands tolerate Po intake w/o s/s of aspirations, no noted distress, rounding ensued, q2 turns will continue to monitor.

## 2024-06-03 NOTE — PROGRESS NOTES
6/3/2024 4:44 PM Daughter in the room and notified of 6:30 PM discharge transport time to [facility]. Pricila PAUL

## 2024-06-03 NOTE — PROGRESS NOTES
"Constance Apodaca is a 89 y.o. female on day 4 of admission presenting with Pneumonia of lower lobe due to infectious organism, unspecified laterality.    Subjective   Had some sun downing yesterday. Better this AM. On RA. Discussed with son in law at bedside. He would like to see how she does with PT before decided what to do.        Objective     VITALS  Blood pressure 118/63, pulse 63, temperature 36.4 °C (97.5 °F), temperature source Temporal, resp. rate 16, height 1.6 m (5' 3\"), weight (!) 42.9 kg (94 lb 9.6 oz), SpO2 94%.  Physical Exam  Vitals and nursing note reviewed.   Constitutional:       General: She is not in acute distress.     Appearance: Normal appearance. She is underweight.   HENT:      Head: Normocephalic and atraumatic.   Cardiovascular:      Rate and Rhythm: Normal rate and regular rhythm.      Heart sounds: Normal heart sounds.   Pulmonary:      Effort: Pulmonary effort is normal. No respiratory distress.      Breath sounds: Normal breath sounds. No wheezing.   Abdominal:      General: Abdomen is flat. Bowel sounds are normal. There is no distension.      Palpations: Abdomen is soft.      Tenderness: There is no abdominal tenderness.   Musculoskeletal:         General: No swelling or tenderness. Normal range of motion.   Skin:     General: Skin is warm and dry.      Capillary Refill: Capillary refill takes less than 2 seconds.   Neurological:      General: No focal deficit present.      Mental Status: She is alert and oriented to person, place, and time.   Psychiatric:         Mood and Affect: Mood normal.           Intake/Output last 3 Shifts:  I/O last 3 completed shifts:  In: 1000 (23.3 mL/kg) [I.V.:1000 (23.3 mL/kg)]  Out: 200 (4.7 mL/kg) [Urine:200 (0.1 mL/kg/hr)]  Weight: 42.9 kg     Relevant Results  No results found for this or any previous visit (from the past 24 hour(s)).      Imaging Results  XR chest 1 view   Final Result   Bilateral peribronchial thickening/bronchitis.   Signed by Manjeet " MD Rupesh          Medications:  amoxicillin-pot clavulanate, 1 tablet, oral, BID  apixaban, 2.5 mg, oral, BID  azithromycin, 500 mg, oral, q24h ELISEO  digoxin, 125 mcg, oral, Once per day on Sunday Tuesday Wednesday Thursday Saturday  dilTIAZem, 30 mg, oral, q6h  [Held by provider] furosemide, 20 mg, oral, Daily  ipratropium-albuteroL, 3 mL, nebulization, TID  [Held by provider] losartan, 100 mg, oral, Daily  metoprolol succinate XL, 150 mg, oral, BID  mirtazapine, 7.5 mg, oral, Nightly  pramipexole, 0.125 mg, oral, Nightly       PRN medications: acetaminophen, ipratropium-albuteroL, ondansetron, oxybutynin, oxygen        Assessment/Plan   Principal Problem:    Pneumonia of lower lobe due to infectious organism, unspecified laterality    Pneumonia of the lower lobe  -Continue Azithro and Augmentin  -Incentive spirometer  -As needed DuoNeb  -procalcitonin elevated     Afib with RVR resolved     -Continue patient metoprolol and digoxin for rate control  -Eliquis   -Cardio consulted, appreciate their recs   -Diltiazem added yesterday         HTN  -Stable for now, hold off on home BP meds at this time     CKD  -Creatinine stable     Systolic chf  - furosemide/ losartan held for now    Copd  -as needed duoneb     HLD  -continue home regimen and as above     DVT Prophylaxis:  Eliquis    Disposition:  Son will decide on DC home vs SNF after seeing her work with PT. Medically ready for DC    Efren Nj, DO [facility] Medicine

## 2024-06-03 NOTE — PROGRESS NOTES
06/03/24 1241   Discharge Planning   Patient expects to be discharged to: [facility], need PT eval and then ask for auth to be started. They state they have a bed avail.     ADOD today/tomorrow  BARRIERS auth  [facility]PO [facility]

## 2024-06-03 NOTE — PROGRESS NOTES
Physical Therapy    Physical Therapy Evaluation & Treatment    Patient Name: Constance Apodaca  MRN: [id number]  Today's Date: 6/3/2024   Time Calculation  Start Time: 1336  Stop Time: 1400  Time Calculation (min): 24 min    Assessment/Plan   PT Assessment  PT Assessment Results: Decreased strength, Decreased endurance, Impaired balance, Decreased mobility, Decreased cognition, Decreased safety awareness, Impaired hearing, Pain  Rehab Prognosis: Good  Barriers to Discharge: none for PT  Evaluation/Treatment Tolerance: Patient limited by fatigue  Medical Staff Made Aware: Yes  Strengths: Access to adaptive/assistive products, Housing layout, Support of Caregivers, Premorbid level of function  Barriers to Participation: Comorbidities  End of Session Communication: Bedside nurse  Assessment Comment: Pt presents with weakness, deconditioning, decreased ambulation and transfers, and moderate unsteadiness; can benefit from skilled PT intervention to assist with discharge planning and address the aforementioned issues to enable the pt to return to their prior level of function, which was independent with ww.  End of Session Patient Position: Up in chair, Alarm on   IP OR SWING BED PT PLAN  Inpatient or Swing Bed: Inpatient  PT Plan  Treatment/Interventions: Bed mobility, Transfer training, Gait training, Balance training, Neuromuscular re-education, Strengthening, Endurance training, Therapeutic exercise, Therapeutic activity  PT Plan: Skilled PT  PT Frequency: 3 times per week  PT Discharge Recommendations: Moderate intensity level of continued care  PT Recommended Transfer Status: Assist x1  PT - OK to Discharge: Yes (PT POC established)      Subjective     General Visit Information:  General  Reason for Referral: Pt presents to the ED with cough and SOB, + PNA, afib with RVR.  Past Medical History Relevant to Rehab:   Past Medical History:   Diagnosis Date    Atherosclerotic heart disease of native coronary artery without  angina pectoris 10/12/2017    Coronary sclerosis    Chronic systolic (congestive) heart failure (Multi) 04/18/2022    Chronic systolic heart failure, ACC/AHA stage C    Disorder of lacrimal system, unspecified 11/08/2018    Defect of lacrimal duct    Encounter for immunization 10/08/2020    Influenza vaccine administered    Fracture of unspecified part of neck of unspecified femur, initial encounter for closed fracture (Multi) 02/18/2021    Femoral neck fracture    Hypokalemia 08/01/2018    Low serum potassium    Other cardiomyopathies (Multi) 02/14/2022    NICM (nonischemic cardiomyopathy)    Other conditions influencing health status     Osteoarthritis    Other injury of unspecified body region, initial encounter 02/14/2020    Contusion of bone    Other specified abnormal findings of blood chemistry 08/12/2014    Azotemia    Other specified disorders of eye and adnexa 11/08/2018    Red eye    Other specified health status 09/28/2017    Influenza vaccination ordered    Pain in right hip 02/14/2020    Hip pain, right    Pain in unspecified foot 02/14/2014    Foot pain    Pain in unspecified hip 03/22/2018    Joint pain, hip    Pain in unspecified knee     Knee pain    Personal history of diseases of the skin and subcutaneous tissue 02/14/2014    History of ingrown nail    Personal history of other diseases of the circulatory system 07/09/2018    History of atrial fibrillation    Personal history of other diseases of the circulatory system 04/19/2018    History of congestive heart failure    Personal history of other diseases of the circulatory system 03/22/2018    History of sinus bradycardia    Personal history of other diseases of the circulatory system     History of hypertension    Personal history of other diseases of the circulatory system     History of peripheral vascular disease    Personal history of other diseases of the digestive system 05/23/2019    History of small bowel obstruction    Personal history  of other diseases of the musculoskeletal system and connective tissue 06/29/2015    History of backache    Personal history of other diseases of the nervous system and sense organs 11/08/2018    History of conjunctivitis    Personal history of other diseases of the nervous system and sense organs 06/16/2016    History of hearing loss    Personal history of other diseases of the nervous system and sense organs 11/17/2016    History of tinnitus    Personal history of other diseases of the nervous system and sense organs 12/29/2017    History of redness of eye    Personal history of other endocrine, nutritional and metabolic disease 04/14/2016    History of hypokalemia    Personal history of other endocrine, nutritional and metabolic disease     History of hyperlipidemia    Personal history of other infectious and parasitic diseases     History of candidal vulvovaginitis    Personal history of other mental and behavioral disorders 08/16/2018    History of depression    Personal history of other specified conditions 09/13/2018    History of fatigue    Personal history of other specified conditions 07/10/2015    History of urinary frequency    Personal history of other specified conditions 03/13/2019    History of abdominal pain    Personal history of other specified conditions 03/15/2020    History of dyspnea    Personal history of other specified conditions 04/25/2022    History of urinary urgency    Personal history of pneumonia (recurrent)     History of pneumonia    Personal history of urinary (tract) infections 11/12/2021    History of urinary tract infection    Personal history of urinary (tract) infections 07/02/2020    History of urinary tract infection    Pneumonitis due to inhalation of food and vomit (Multi)     Aspiration pneumonia of right lower lobe due to regurgitated food    Presence of unspecified artificial hip joint 10/22/2019    Status post hip replacement    Presence of unspecified artificial hip  joint 03/22/2018    Status post hip replacement    Rash and other nonspecific skin eruption 11/05/2013    Rash    Retention of urine, unspecified 07/17/2015    Incomplete bladder emptying    Unilateral primary osteoarthritis, right knee     Osteoarthritis of right knee    Unspecified fall, initial encounter 02/15/2018    Fall, accidental     Past Surgical History:   Procedure Laterality Date    OTHER SURGICAL HISTORY  06/07/2013    Bypass Graft Using Vein: Femoral-popliteal    TOTAL ABDOMINAL HYSTERECTOMY W/ BILATERAL SALPINGOOPHORECTOMY  07/10/2015    Total Abdominal Hysterectomy With Removal Of Both Ovaries       Family/Caregiver Present: Yes  Caregiver Feedback: sister present  Prior to Session Communication: Bedside nurse  Patient Position Received: Bed, 3 rail up, Alarm off, not on at start of session  Preferred Learning Style: auditory, kinesthetic  General Comment: Pt is pleasant and cooperative, receptive to PT. Pt is deconditioned from being in bed x 5 days. Pt up in chair after PT session.  Home Living:  Home Living  Type of Home: House  Lives With: Adult children (Dtr with Alzheimer's and son in law)  Home Adaptive Equipment: Walker rolling or standard  Home Layout: One level  Home Access: Level entry  Prior Level of Function:  Prior Function Per Pt/Caregiver Report  Receives Help From:  (Son in law + dtr)  ADL Assistance: Independent  Ambulatory Assistance: Independent (with ww)  Precautions:  Precautions  Hearing/Visual Limitations: [place], no hearing aides in  Medical Precautions: Fall precautions    Objective   Pain:  Pain Assessment  Pain Assessment: 0-10  Pain Score: 0 - No pain (reports no pain, but pt moans in pain with mvmt of the legs)  Cognition:  Cognition  Overall Cognitive Status: Impaired at baseline  Orientation Level: Disoriented to time, Disoriented to situation  Following Commands: Follows one step commands with increased time  Insight: Mild    General Assessments:   Activity  Tolerance  Endurance: Decreased tolerance for upright activites    Sensation  Light Touch: No apparent deficits    Perception  Inattention/Neglect: Appears intact    Coordination  Movements are Fluid and Coordinated: Yes    Static Sitting Balance  Static Sitting-Balance Support: Feet supported, Bilateral upper extremity supported  Static Sitting-Level of Assistance: Close supervision  Static Sitting-Comment/Number of Minutes: 4    Static Standing Balance  Static Standing-Balance Support: Bilateral upper extremity supported  Static Standing-Level of Assistance: Moderate assistance  Static Standing-Comment/Number of Minutes: 2  Dynamic Standing Balance  Dynamic Standing-Balance Support: Bilateral upper extremity supported  Dynamic Standing-Balance: Turning  Dynamic Standing-Comments: mod A  Functional Assessments:  Bed Mobility  Bed Mobility: Yes  Bed Mobility 1  Bed Mobility 1: Supine to sitting  Level of Assistance 1: Maximum assistance, Maximum verbal cues, Maximum tactile cues  Bed Mobility Comments 1: extra time and effort to complete    Transfers  Transfer: Yes  Transfer 1  Technique 1: Sit to stand  Transfer Device 1: Walker  Transfer Level of Assistance 1: Moderate assistance, Moderate verbal cues, Moderate tactile cues  Trials/Comments 1: Pt provided instruction in safe sit<->stand technique to enable them to move in/out of bed/chair safely; pt required moderate tactile and verbal cues for proper hand placements and to scoot to edge of sitting surface to facilitate ease of sit->stand.  Extremity/Trunk Assessments:        RLE   RLE :  (grossly 3/5)  LLE   LLE :  (grossly 3/5)  Treatments:  Balance/Neuromuscular Re-Education  Balance/Neuromuscular Re-Education Activity Performed: Yes  Balance/Neuromuscular Re-Education Activity 1: To increase standing balance, core strength, functional endurance, and facilitate postural/trunk control, pt at  with mod A x 2 minutes. Max verbal cues for upright posture, midline  orientation, cervical extension and increased duration. Duration limited d/t fatigue.    Ambulation/Gait Training  Ambulation/Gait Training Performed: Yes  Ambulation/Gait Training 1  Surface 1: Level tile  Device 1: Rolling walker  Assistance 1: Moderate assistance, Moderate verbal cues, Moderate tactile cues  Quality of Gait 1: Narrow base of support, Forward flexed posture, Decreased step length, Shuffling gait  Comments/Distance (ft) 1: 3' to chair  Transfers  Transfers 2  Technique 2: Stand to sit  Transfer Device 2: Walker  Transfer Level of Assistance 2: Moderate assistance, Moderate verbal cues, Moderate tactile cues  Trials/Comments 2: Cues to line up to and reach back for sitting surface before sitting provided. Hand over hand assist needed.  Transfers 3  Technique 3: Sit to stand, Stand to sit  Transfer Device 3: Walker  Transfer Level of Assistance 3: Moderate assistance, Moderate verbal cues, Moderate tactile cues  Trials/Comments 3: Pt required cues for sequencing.  Outcome Measures:  [facility] Basic Mobility  Turning from your back to your side while in a flat bed without using bedrails: A lot  Moving from lying on your back to sitting on the side of a flat bed without using bedrails: A lot  Moving to and from bed to chair (including a wheelchair): A lot  Standing up from a chair using your arms (e.g. wheelchair or bedside chair): A lot  To walk in hospital room: A lot  Climbing 3-5 steps with railing: Total  Basic Mobility - Total Score: 11    Encounter Problems       Encounter Problems (Active)       Balance       STG - Maintains dynamic standing balance with upper extremity support x 5 minutes with min A       Start:  06/03/24    Expected End:  06/17/24       INTERVENTIONS:  1. Practice standing with minimal support.  2. Educate patient about standing tolerance.  3. Educate patient about independence with gait, transfers, and ADL's.  4. Educate patient about use of assistive device.  5. Educate patient  about self-directed care.         STG - Maintains dynamic sitting balance with upper extremity support x 5 minutes with indep       Start:  06/03/24    Expected End:  06/17/24       INTERVENTIONS:  1. Practice sitting on the edge of a bed/mat with minimal support.  2. Educate patient about maintining total hip precautions while maintaining balance.  3. Educate patient about pressure relief.  4. Educate patient about use of assistive device.            Mobility       STG - Patient will ambulate with ww x 75' with min A       Start:  06/03/24    Expected End:  06/17/24            Increase BLE strength to attain functional goals achieved through supine, seated, and standing TE.       Start:  06/03/24    Expected End:  06/17/24               PT Transfers       STG - Transfer from bed to chair with ww with CGA       Start:  06/03/24    Expected End:  06/17/24            STG - Patient to transfer to and from sit to supine with CGA       Start:  06/03/24    Expected End:  06/17/24            STG - Patient will transfer sit to and from stand to ww with CGA       Start:  06/03/24    Expected End:  06/17/24               Pain - Adult              Education Documentation  Body Mechanics, taught by Narcisa Valencia, PT at 6/3/2024  2:27 PM.  Learner: Family, Patient  Readiness: Acceptance  Method: Explanation  Response: Verbalizes Understanding, Demonstrated Understanding, Needs Reinforcement    Mobility Training, taught by Narcisa Valencia, PT at 6/3/2024  2:27 PM.  Learner: Family, Patient  Readiness: Acceptance  Method: Explanation  Response: Verbalizes Understanding, Demonstrated Understanding, Needs Reinforcement    Education Comments  No comments found.

## 2024-06-03 NOTE — PROGRESS NOTES
"Subjective Data:  No chest pain, no trouble breathing  At telemetry Afib 60-70x'  No pauses    Overnight Events:    None reported     Objective Data:  Last Recorded Vitals:  Vitals:    06/03/24 0430 06/03/24 0732 06/03/24 0836 06/03/24 1150   BP: 132/62 118/63  123/64   BP Location: Left arm Left arm  Left arm   Patient Position: Lying Lying  Lying   Pulse: 88 63  66   Resp:  16  16   Temp: 36.2 °C (97.2 °F) 36.4 °C (97.5 °F)  36.5 °C (97.7 °F)   TempSrc: Skin Temporal  Temporal   SpO2: 96% 94% 94% 95%   Weight:       Height:           Last Labs:  CBC - 6/1/2024:  5:56 AM  11.5 10.8 103    32.4      CMP - 6/1/2024:  5:56 AM  7.0 8.3 56 --- 1.0   _ 4.1 14 37      PTT - No results in last year.  _   _ _     BNP   Date/Time Value Ref Range Status   05/31/2024 06:52  0 - 99 pg/mL Final   05/30/2024 01:11  0 - 99 pg/mL Final     HGBA1C   Date/Time Value Ref Range Status   04/01/2019 05:16 AM 5.8 % Final     Comment:          Diagnosis of Diabetes-Adults   Non-Diabetic: < or = 5.6%   Increased risk for developing diabetes: 5.7-6.4%   Diagnostic of diabetes: > or = 6.5%  .       Monitoring of Diabetes                Age (y)     Therapeutic Goal (%)   Adults:          >18           <7.0   Pediatrics:    13-18           <7.5                   7-12           <8.0                   0- 6            7.5-8.5   American Diabetes Association. Diabetes Care 33(S1), Jan 2010.       VLDL   Date/Time Value Ref Range Status   04/20/2018 04:58 AM 21 0 - 40 mg/dL Final      Last I/O:  I/O last 3 completed shifts:  In: 1000 (23.3 mL/kg) [I.V.:1000 (23.3 mL/kg)]  Out: 200 (4.7 mL/kg) [Urine:200 (0.1 mL/kg/hr)]  Weight: 42.9 kg     Past Cardiology Tests (Last 3 Years):  EKG:  Electrocardiogram, 12-lead PRN ACS symptoms 05/30/2024      ECG 12 lead 05/30/2024      ECG 12 lead 05/30/2024    Echo:  No results found for this or any previous visit from the past 1095 days.    Ejection Fractions:  No results found for: \"EF\"  Cath:  No " results found for this or any previous visit from the past 1095 days.    Stress Test:  No results found for this or any previous visit from the past 1095 days.    Cardiac Imaging:  No results found for this or any previous visit from the past 1095 days.      Inpatient Medications:  Scheduled medications   Medication Dose Route Frequency    amoxicillin-pot clavulanate  1 tablet oral BID    apixaban  2.5 mg oral BID    azithromycin  500 mg oral q24h ELISEO    digoxin  125 mcg oral Once per day on Sunday Tuesday Wednesday Thursday Saturday    dilTIAZem  30 mg oral q6h    [Held by provider] furosemide  20 mg oral Daily    ipratropium-albuteroL  3 mL nebulization TID    [Held by provider] losartan  100 mg oral Daily    metoprolol succinate XL  150 mg oral BID    mirtazapine  7.5 mg oral Nightly    pramipexole  0.125 mg oral Nightly     PRN medications   Medication    acetaminophen    ipratropium-albuteroL    ondansetron    oxybutynin    oxygen     Continuous Medications   Medication Dose Last Rate       Physical Exam:  GENERAL: alert, cooperative, pleasant, in no acute distress  SKIN: warm, dry  NECK: no JVD  CARDIAC: Irregularly irregular tachycardic  CHEST: Diminished breath sounds on supplemental oxygen via 4L NC  ABDOMEN: soft, nondistended  EXTREMITIES: no lower extremity edema  NEURO: Alert and oriented x 3.  Grossly normal.  Moves all 4 extremities.         Assessment/Plan   Patient currently is asymptomatic but does endorse cough and SOB. She is largely rate controlled at this time with HR between 95 and 115 bpm. There are no signs of decompensated HF on exam. In fact, she looks slightly hypovolemic on exam.      #Atrial fibrillation- Increased metoprolol to 150 mg BID. Rates improved but remain elevated    #HFpEF- diuretics currently on hold  #CKD  #HTN  #Pneumonia- on Antibiotics. Management per primary team      Recommendations   -Continue  Metoprolol and Digoxin  -Stop diltiazem  -Continue Eliquis for  anticoagulation       Code Status:  Full Code    Kaveh Laguna MD

## 2024-06-03 NOTE — CARE PLAN
Problem: Safety - Adult  Goal: Free from fall injury  Outcome: Progressing  Flowsheets (Taken 6/3/2024 0307)  Free from fall injury:   Instruct family/caregiver on patient safety   Based on caregiver fall risk screen, instruct family/caregiver to ask for assistance with transferring infant if caregiver noted to have fall risk factors     P  Problem: Chronic Conditions and Co-morbidities  Goal: Patient's chronic conditions and co-morbidity symptoms are monitored and maintained or improved  Flowsheets (Taken 6/3/2024 0307)  Care Plan - Patient's Chronic Conditions and Co-Morbidity Symptoms are Monitored and Maintained or Improved:   Monitor and assess patient's chronic conditions and comorbid symptoms for stability, deterioration, or improvement   Collaborate with multidisciplinary team to address chronic and comorbid conditions and prevent exacerbation or deterioration   Update acute care plan with appropriate goals if chronic or comorbid symptoms are exacerbated and prevent overall improvement and discharge

## 2024-06-03 NOTE — PROGRESS NOTES
6/3/2024 10:09 AM Son in law said [facility] is [facility] [place]. I have requested to start auth. Pricila PAUL

## 2024-06-04 NOTE — CONSULTS
Nutrition Assessment Note    -late entry-    Reason for Assessment  Reason for Assessment: Admission nursing screening    Pt admitted for:  Atrial fibrillation with RVR (Multi) [I48.91]  Pneumonia of lower lobe due to infectious organism, unspecified laterality [J18.9]  Congestive heart failure, unspecified HF chronicity, unspecified heart failure type (Multi) [I50.9]    MST triggered for unsure for weight loss.  Chart reviewed and pt visited.  Reported poor intake for several days prior to admission, fair intake per flowsheets noted.  Unknown UBW.    When well typically eats 3 meals a day.    Pt agreeable to supplement while admitted.    Past Medical History:   Diagnosis Date    Atherosclerotic heart disease of native coronary artery without angina pectoris 10/12/2017    Coronary sclerosis    Chronic systolic (congestive) heart failure (Multi) 04/18/2022    Chronic systolic heart failure, ACC/AHA stage C    Disorder of lacrimal system, unspecified 11/08/2018    Defect of lacrimal duct    Encounter for immunization 10/08/2020    Influenza vaccine administered    Fracture of unspecified part of neck of unspecified femur, initial encounter for closed fracture (Multi) 02/18/2021    Femoral neck fracture    Hypokalemia 08/01/2018    Low serum potassium    Other cardiomyopathies (Multi) 02/14/2022    NICM (nonischemic cardiomyopathy)    Other conditions influencing health status     Osteoarthritis    Other injury of unspecified body region, initial encounter 02/14/2020    Contusion of bone    Other specified abnormal findings of blood chemistry 08/12/2014    Azotemia    Other specified disorders of eye and adnexa 11/08/2018    Red eye    Other specified health status 09/28/2017    Influenza vaccination ordered    Pain in right hip 02/14/2020    Hip pain, right    Pain in unspecified foot 02/14/2014    Foot pain    Pain in unspecified hip 03/22/2018    Joint pain, hip    Pain in unspecified knee     Knee pain    Personal  history of diseases of the skin and subcutaneous tissue 02/14/2014    History of ingrown nail    Personal history of other diseases of the circulatory system 07/09/2018    History of atrial fibrillation    Personal history of other diseases of the circulatory system 04/19/2018    History of congestive heart failure    Personal history of other diseases of the circulatory system 03/22/2018    History of sinus bradycardia    Personal history of other diseases of the circulatory system     History of hypertension    Personal history of other diseases of the circulatory system     History of peripheral vascular disease    Personal history of other diseases of the digestive system 05/23/2019    History of small bowel obstruction    Personal history of other diseases of the musculoskeletal system and connective tissue 06/29/2015    History of backache    Personal history of other diseases of the nervous system and sense organs 11/08/2018    History of conjunctivitis    Personal history of other diseases of the nervous system and sense organs 06/16/2016    History of hearing loss    Personal history of other diseases of the nervous system and sense organs 11/17/2016    History of tinnitus    Personal history of other diseases of the nervous system and sense organs 12/29/2017    History of redness of eye    Personal history of other endocrine, nutritional and metabolic disease 04/14/2016    History of hypokalemia    Personal history of other endocrine, nutritional and metabolic disease     History of hyperlipidemia    Personal history of other infectious and parasitic diseases     History of candidal vulvovaginitis    Personal history of other mental and behavioral disorders 08/16/2018    History of depression    Personal history of other specified conditions 09/13/2018    History of fatigue    Personal history of other specified conditions 07/10/2015    History of urinary frequency    Personal history of other specified  conditions 03/13/2019    History of abdominal pain    Personal history of other specified conditions 03/15/2020    History of dyspnea    Personal history of other specified conditions 04/25/2022    History of urinary urgency    Personal history of pneumonia (recurrent)     History of pneumonia    Personal history of urinary (tract) infections 11/12/2021    History of urinary tract infection    Personal history of urinary (tract) infections 07/02/2020    History of urinary tract infection    Pneumonitis due to inhalation of food and vomit (Multi)     Aspiration pneumonia of right lower lobe due to regurgitated food    Presence of unspecified artificial hip joint 10/22/2019    Status post hip replacement    Presence of unspecified artificial hip joint 03/22/2018    Status post hip replacement    Rash and other nonspecific skin eruption 11/05/2013    Rash    Retention of urine, unspecified 07/17/2015    Incomplete bladder emptying    Unilateral primary osteoarthritis, right knee     Osteoarthritis of right knee    Unspecified fall, initial encounter 02/15/2018    Fall, accidental     Results for orders placed or performed during the hospital encounter of 05/30/24 (from the past 96 hour(s))   Digoxin   Result Value Ref Range    Digoxin  0.67 (L) 0.80 - <2.00 ng/mL   Basic Metabolic Panel   Result Value Ref Range    Glucose 96 74 - 99 mg/dL    Sodium 134 (L) 136 - 145 mmol/L    Potassium 3.5 3.5 - 5.3 mmol/L    Chloride 102 98 - 107 mmol/L    Bicarbonate 24 21 - 32 mmol/L    Anion Gap 12 10 - 20 mmol/L    Urea Nitrogen 30 (H) 6 - 23 mg/dL    Creatinine 1.23 (H) 0.50 - 1.05 mg/dL    eGFR 40 (L) >60 mL/min/1.73m*2    Calcium 7.0 (L) 8.6 - 10.3 mg/dL   CBC   Result Value Ref Range    WBC 11.5 (H) 4.4 - 11.3 x10*3/uL    nRBC 0.0 0.0 - 0.0 /100 WBCs    RBC 3.42 (L) 4.00 - 5.20 x10*6/uL    Hemoglobin 10.8 (L) 12.0 - 16.0 g/dL    Hematocrit 32.4 (L) 36.0 - 46.0 %    MCV 95 80 - 100 fL    MCH 31.6 26.0 - 34.0 pg    MCHC 33.3  "32.0 - 36.0 g/dL    RDW 13.3 11.5 - 14.5 %    Platelets 103 (L) 150 - 450 x10*3/uL   Lavender Top   Result Value Ref Range    Extra Tube Hold for add-ons.    Digoxin   Result Value Ref Range    Digoxin  0.72 (L) 0.80 - <2.00 ng/mL   TSH with reflex to Free T4 if abnormal   Result Value Ref Range    Thyroid Stimulating Hormone 1.80 0.44 - 3.98 mIU/L     History:  Food and Nutrient History  Energy Intake: Poor < 50 %    Anthropometrics:  Height: 160 cm (5' 3\")  Weight: (!) 42.9 kg (94 lb 9.6 oz)  BMI (Calculated): 16.76    Weight Change  Weight History / % Weight Change: no weight history    Estimated Energy Needs  Total Energy Estimated Needs (kCal): 1290 kCal  Total Estimated Energy Need per Day (kCal/kg): 1500 kCal/kg  Method for Estimating Needs: 30-35    Estimated Protein Needs  Total Protein Estimated Needs (g): 40 g  Total Protein Estimated Needs (g/kg): 65 g/kg  Method for Estimating Needs: 1.0-1.5    Estimated Fluid Needs  Method for Estimating Needs: 1ml/kcal or per MD    Nutrition Focused Physical Findings:  Subcutaneous Fat Loss  Orbital Fat Pads: Mild-Moderate (slight dark circles and slight hollowing)  Buccal Fat Pads: Well nourished (full, rounded cheeks)    Muscle Wasting  Temporalis: Mild-Moderate (slight depression)  Pectoralis (Clavicular Region): Mild-Moderate (some protrusion of clavicle)    Edema  Edema: none    Physical Findings (Nutrition Deficiency/Toxicity)  Skin: Positive (bruise)     Nutrition Diagnosis   Malnutrition Diagnosis  Patient has Malnutrition Diagnosis: Yes  Diagnosis Status: New  Malnutrition Diagnosis: Severe malnutrition related to chronic disease or condition  As Evidenced by: moderate subcutaneous fat loss, moderate muscle wasting and report of poor intake prior to hospital admit    Patient has Nutrition Diagnosis: Yes  Nutrition Diagnosis 1: Underweight  Diagnosis Status (1): New  Related to (1): chronic illness  As Evidenced by (1): BMI 16.8      Nutrition " Interventions/Recommendations   Food and/or Nutrient Delivery Interventions  Meals and Snacks: General healthful diet  Goal: > 75% of meals consumed     Medical Food Supplement: Commercial beverage  Goal: Ensure Clear TID for encouraged intake    Additional Interventions: Consider appetite stimulant    Education Documentation  No documentation found.      Nutrition Monitoring and Evaluation   Food and Nutrient Related History  Energy Intake: Estimated energy intake    Fluid Intake: Estimated fluid intake    Amount of Food: Estimated amout of food, Medical food intake    Mealtime Behavior: Limited number of accepted foods    Anthropometrics: Body Composition/Growth/Weight History  Weight Change: Weight gain, Weight loss    Biochemical Data, Medical Tests and Procedures  Electrolyte and Renal Panel: Other (Comment)  Criteria: as clinically indicated    Gastrointestinal Profile: Other (Comment)  Criteria: as clinically indicated    Glucose/Endocrine Profile: Other (Comment)  Criteria: as clinically indicated    Nutritional Anemia Profile: Other (Comment)  Criteria: as clinically indicated    Vitamin Profile: Other (Comment)  Criteria: as clinically indicated    Nutrition Focused Physical Findings  Adipose: Loss of subcutaneous fat    Digestive System: Decrease in appetite    Muscles: Muscle atrophy    Other: Stool output, Urine volume, Overall appearance    Follow Up  Time Spent (min): 60 minutes  Last Date of Nutrition Visit: 06/03/24  Nutrition Follow-Up Needed?: Dietitian to reassess per policy  Follow up Comment: JACK DIAS

## 2024-06-04 NOTE — DISCHARGE SUMMARY
Discharge Diagnosis  Pneumonia of lower lobe due to infectious organism, unspecified laterality    Issues Requiring Follow-Up  Cardio follow up     Discharge Meds     Your medication list        START taking these medications        Instructions Last Dose Given Next Dose Due   amoxicillin-pot clavulanate 500-125 mg tablet  Commonly known as: Augmentin      Take 1 tablet by mouth 2 times a day for 3 days.       azithromycin 500 mg tablet  Commonly known as: Zithromax  Start taking on: June 4, 2024      Take 1 tablet (500 mg) by mouth once every 24 hours for 2 days.              CONTINUE taking these medications        Instructions Last Dose Given Next Dose Due   calcium carbonate-vit D3-min 600 mg calcium- 400 unit tablet           digoxin 125 MCG tablet  Commonly known as: Lanoxin           Eliquis 2.5 mg tablet  Generic drug: apixaban      Take 1 tablet (2.5 mg) by mouth 2 times a day.       furosemide 20 mg tablet  Commonly known as: Lasix           losartan 100 mg tablet  Commonly known as: Cozaar           metoprolol succinate  mg 24 hr tablet  Commonly known as: Toprol-XL      Take 1 tablet (100 mg) by mouth 2 times a day. TAKE 1 TABLET BY MOUTH TWICE DAILY       mirtazapine 7.5 mg tablet  Commonly known as: Remeron           oxybutynin XL 5 mg 24 hr tablet  Commonly known as: Ditropan-XL      TAKE 1 TABLET IN THE MORNING AND 1 IN THE EVENING as needed for urinary frequency.  Causes dry mouth       potassium chloride CR 20 mEq ER tablet  Commonly known as: Klor-Con M20           pramipexole 0.125 mg tablet  Commonly known as: Mirapex           simvastatin 20 mg tablet  Commonly known as: Zocor      Take 1 tablet (20 mg) by mouth once daily at bedtime.                 Where to Get Your Medications        These medications were sent to [id number] - [place], [place] - [address]  [address], [facility] [address]      Phone: [phone]   amoxicillin-pot clavulanate 500-125 mg  "tablet  azithromycin 500 mg tablet         Test Results Pending At Discharge  Pending Labs       No current pending labs.            Procedures       Hospital Course   Constance was admitted to hospital with A-fib RVR.  Was felt that she likely had developed pneumonia which exacerbated this.  She was given fluids and did slowly improve things.  She did require a short course of oral diltiazem to help keep the heart rate under control though this has stabilized.  She will need to finish out a course of oral antibiotics.  She is also follow-up with her primary care physician as well as her cardiologist for further evaluation of this heart rhythm.  At the time she is otherwise hemodynamically stable appropriate for discharge.  She went to a skilled nursing facility for some rehab.  This plan discussed with her son-in-law and daughter and they are all in agreement.  All questions were answered.    Blood pressure 123/65, pulse 64, temperature 36.5 °C (97.7 °F), temperature source Temporal, resp. rate 16, height 1.6 m (5' 3\"), weight (!) 42.9 kg (94 lb 9.6 oz), SpO2 94%.  Pertinent Physical Exam At Time of Discharge  Physical Exam  Vitals and nursing note reviewed.   Constitutional:       General: She is not in acute distress.     Appearance: Normal appearance. She is underweight.   HENT:      Head: Normocephalic and atraumatic.   Cardiovascular:      Rate and Rhythm: Normal rate and regular rhythm.      Heart sounds: Normal heart sounds.   Pulmonary:      Effort: Pulmonary effort is normal. No respiratory distress.      Breath sounds: Normal breath sounds. No wheezing.   Abdominal:      General: Abdomen is flat. Bowel sounds are normal. There is no distension.      Palpations: Abdomen is soft.      Tenderness: There is no abdominal tenderness.   Musculoskeletal:         General: No swelling or tenderness. Normal range of motion.   Skin:     General: Skin is warm and dry.      Capillary Refill: Capillary refill takes less than " 2 seconds.   Neurological:      General: No focal deficit present.      Mental Status: She is alert and oriented to person, place, and time.   Psychiatric:         Mood and Affect: Mood normal.         Outpatient Follow-Up  Future Appointments   Date Time Provider Department Center   8/15/2024 10:30 AM Perla Lewis MD [id number] [facility]         Efren Nj DO FACP     Time spent during this discharge: >30 min

## 2024-06-06 ENCOUNTER — NURSING HOME VISIT (OUTPATIENT)
Dept: POST ACUTE CARE | Facility: EXTERNAL LOCATION | Age: 89
End: 2024-06-06
Payer: MEDICARE

## 2024-06-06 DIAGNOSIS — I48.20 CHRONIC ATRIAL FIBRILLATION (MULTI): ICD-10-CM

## 2024-06-06 DIAGNOSIS — I50.20 HFREF (HEART FAILURE WITH REDUCED EJECTION FRACTION) (MULTI): ICD-10-CM

## 2024-06-06 DIAGNOSIS — J18.9 PNEUMONIA OF LOWER LOBE DUE TO INFECTIOUS ORGANISM, UNSPECIFIED LATERALITY: ICD-10-CM

## 2024-06-06 PROCEDURE — 99305 1ST NF CARE MODERATE MDM 35: CPT | Performed by: FAMILY MEDICINE

## 2024-06-06 NOTE — LETTER
Patient: Constance Apodaca  : 1926    Encounter Date: 2024    Constance Apodaca is a 89 y.o. female with Chief Complaint of SNF ([facility]) H&P    Resident seen 24 -- NELSON    CC: SNF ([facility]) H&P    : 1926  Admit H&P done 7/15/22, 24  NKDA  previously DNR-CCA () changed to FULL CODE    S: 89 yo woman with COPD, HFrEF (20%), HTN, GERD, AFIB, s/p remote Left femur fx s/p HA () admitted to SNF rehab after hospitalization for PNA. Completing abx today. No CP/SOB. Med list & problem list reviewed.    O: VSS AFEB 99# (down 18# from ) 117# Awake, alert, pleasantly confused. Dry MM, + skin tenting. Chest cta. Heart irreg irreg. Ext no c/c/e.    LAB (6/10/24) Pending CMP/CBC/Dig/Fe/Ferritin/B12  (24) Alb 2.8, Cr 0.92, BUN 12, GFR 57, Na 139, K 3.8, Hgb 10.7    BIMS () 15/15    A/P:  # Weakness/sarcopenia: SNF PT/OT  # CAP: complete Levaquin/Azithro 24, cut duonebs back to PRN only.  # Clinically volume contracted: hold lasix -6/10/24.  # PCM/Decreased appetite: add TID dietary supplements after meals. continue mirtazepine 7.5 mg.  # AFIB: eliquis 2.5 bid, rate controlled with digoxin and BB.  # HFrEF: digoxin, lasix 20, ARB, BB  # FEN: KCl, Ca+D  # HLD: statin  # anemia: stable. Check indices 6/10/24.  #  hx Left femur fx s/p Left HA.      Past Surgical History:   Procedure Laterality Date   • OTHER SURGICAL HISTORY  2013    Bypass Graft Using Vein: Femoral-popliteal   • TOTAL ABDOMINAL HYSTERECTOMY W/ BILATERAL SALPINGOOPHORECTOMY  07/10/2015    Total Abdominal Hysterectomy With Removal Of Both Ovaries      Social History     Socioeconomic History   • Marital status:      Spouse name: Not on file   • Number of children: Not on file   • Years of education: Not on file   • Highest education level: Not on file   Occupational History   • Not on file   Tobacco Use   • Smoking status: Not on file   • Smokeless tobacco: Not on file   Substance and Sexual Activity   • Alcohol  use: Not on file   • Drug use: Not on file   • Sexual activity: Not on file   Other Topics Concern   • Not on file   Social History Narrative   • Not on file     Social Determinants of Health     Financial Resource Strain: Patient Declined (5/30/2024)    Overall Financial Resource Strain (CARDIA)    • Difficulty of Paying Living Expenses: Patient declined   Food Insecurity: Not on file   Transportation Needs: No Transportation Needs (5/31/2024)    PRAPARE - Transportation    • Lack of Transportation (Medical): No    • Lack of Transportation (Non-Medical): No   Physical Activity: Not on file   Stress: Not on file   Social Connections: Not on file   Intimate Partner Violence: Not on file   Housing Stability: Low Risk  (5/31/2024)    Housing Stability Vital Sign    • Unable to Pay for Housing in the Last Year: No    • Number of Places Lived in the Last Year: 1    • Unstable Housing in the Last Year: No     Past Medical History:   Diagnosis Date   • Atherosclerotic heart disease of native coronary artery without angina pectoris 10/12/2017    Coronary sclerosis   • Chronic systolic (congestive) heart failure (Multi) 04/18/2022    Chronic systolic heart failure, ACC/AHA stage C   • Disorder of lacrimal system, unspecified 11/08/2018    Defect of lacrimal duct   • Encounter for immunization 10/08/2020    Influenza vaccine administered   • Fracture of unspecified part of neck of unspecified femur, initial encounter for closed fracture (Multi) 02/18/2021    Femoral neck fracture   • Hypokalemia 08/01/2018    Low serum potassium   • Other cardiomyopathies (Multi) 02/14/2022    NICM (nonischemic cardiomyopathy)   • Other conditions influencing health status     Osteoarthritis   • Other injury of unspecified body region, initial encounter 02/14/2020    Contusion of bone   • Other specified abnormal findings of blood chemistry 08/12/2014    Azotemia   • Other specified disorders of eye and adnexa 11/08/2018    Red eye   • Other  specified health status 09/28/2017    Influenza vaccination ordered   • Pain in right hip 02/14/2020    Hip pain, right   • Pain in unspecified foot 02/14/2014    Foot pain   • Pain in unspecified hip 03/22/2018    Joint pain, hip   • Pain in unspecified knee     Knee pain   • Personal history of diseases of the skin and subcutaneous tissue 02/14/2014    History of ingrown nail   • Personal history of other diseases of the circulatory system 07/09/2018    History of atrial fibrillation   • Personal history of other diseases of the circulatory system 04/19/2018    History of congestive heart failure   • Personal history of other diseases of the circulatory system 03/22/2018    History of sinus bradycardia   • Personal history of other diseases of the circulatory system     History of hypertension   • Personal history of other diseases of the circulatory system     History of peripheral vascular disease   • Personal history of other diseases of the digestive system 05/23/2019    History of small bowel obstruction   • Personal history of other diseases of the musculoskeletal system and connective tissue 06/29/2015    History of backache   • Personal history of other diseases of the nervous system and sense organs 11/08/2018    History of conjunctivitis   • Personal history of other diseases of the nervous system and sense organs 06/16/2016    History of hearing loss   • Personal history of other diseases of the nervous system and sense organs 11/17/2016    History of tinnitus   • Personal history of other diseases of the nervous system and sense organs 12/29/2017    History of redness of eye   • Personal history of other endocrine, nutritional and metabolic disease 04/14/2016    History of hypokalemia   • Personal history of other endocrine, nutritional and metabolic disease     History of hyperlipidemia   • Personal history of other infectious and parasitic diseases     History of candidal vulvovaginitis   • Personal  history of other mental and behavioral disorders 08/16/2018    History of depression   • Personal history of other specified conditions 09/13/2018    History of fatigue   • Personal history of other specified conditions 07/10/2015    History of urinary frequency   • Personal history of other specified conditions 03/13/2019    History of abdominal pain   • Personal history of other specified conditions 03/15/2020    History of dyspnea   • Personal history of other specified conditions 04/25/2022    History of urinary urgency   • Personal history of pneumonia (recurrent)     History of pneumonia   • Personal history of urinary (tract) infections 11/12/2021    History of urinary tract infection   • Personal history of urinary (tract) infections 07/02/2020    History of urinary tract infection   • Pneumonitis due to inhalation of food and vomit (Multi)     Aspiration pneumonia of right lower lobe due to regurgitated food   • Presence of unspecified artificial hip joint 10/22/2019    Status post hip replacement   • Presence of unspecified artificial hip joint 03/22/2018    Status post hip replacement   • Rash and other nonspecific skin eruption 11/05/2013    Rash   • Retention of urine, unspecified 07/17/2015    Incomplete bladder emptying   • Unilateral primary osteoarthritis, right knee     Osteoarthritis of right knee   • Unspecified fall, initial encounter 02/15/2018    Fall, accidental      No family history on file.     Review of Systems   Constitutional:  Negative for chills, fatigue and fever.   HENT:  Negative for rhinorrhea and sore throat.    Eyes:  Negative for pain and redness.   Respiratory:  Negative for cough and shortness of breath.    Cardiovascular:  Negative for chest pain and palpitations.   Gastrointestinal:  Negative for abdominal pain and blood in stool.   Endocrine: Negative for polydipsia and polyuria.   Genitourinary:  Negative for dysuria and hematuria.   Musculoskeletal:  Negative for back  pain and neck stiffness.   Skin:  Negative for rash and wound.   Allergic/Immunologic: Negative for environmental allergies and food allergies.   Neurological:  Positive for weakness. Negative for headaches.   Hematological:  Negative for adenopathy. Does not bruise/bleed easily.   Psychiatric/Behavioral:  Positive for confusion. Negative for hallucinations and suicidal ideas.       There were no vitals taken for this visit.  Physical Exam  Vitals reviewed.   Constitutional:       General: She is not in acute distress.     Appearance: She is not ill-appearing.   HENT:      Head: Normocephalic and atraumatic.      Right Ear: Tympanic membrane normal.      Left Ear: Tympanic membrane normal.      Nose: No congestion or rhinorrhea.      Mouth/Throat:      Mouth: Mucous membranes are dry.      Pharynx: No oropharyngeal exudate or posterior oropharyngeal erythema.   Eyes:      Extraocular Movements: Extraocular movements intact.      Conjunctiva/sclera: Conjunctivae normal.      Pupils: Pupils are equal, round, and reactive to light.   Cardiovascular:      Rate and Rhythm: Normal rate. Rhythm irregular.      Heart sounds: No murmur heard.     No friction rub. No gallop.   Pulmonary:      Effort: Pulmonary effort is normal.      Breath sounds: Normal breath sounds. No wheezing, rhonchi or rales.   Abdominal:      General: There is no distension.      Palpations: Abdomen is soft.      Tenderness: There is no abdominal tenderness. There is no guarding or rebound.   Musculoskeletal:         General: No swelling or deformity.      Cervical back: Normal range of motion and neck supple.      Right lower leg: No edema.      Left lower leg: No edema.   Skin:     Capillary Refill: Capillary refill takes less than 2 seconds.      Coloration: Skin is not jaundiced.      Findings: No rash.   Neurological:      General: No focal deficit present.      Mental Status: She is alert.      Motor: Weakness present.   Psychiatric:          "Mood and Affect: Mood normal.         Behavior: Behavior normal.       Lab Results   Component Value Date    WBC 11.5 (H) 06/01/2024    HGB 10.8 (L) 06/01/2024    HCT 32.4 (L) 06/01/2024    MCV 95 06/01/2024     (L) 06/01/2024     Lab Results   Component Value Date    CHOL 103 04/20/2018     Lab Results   Component Value Date    HDL 20.5 (A) 04/20/2018     No results found for: \"LDLCALC\"  Lab Results   Component Value Date    TRIG 103 04/20/2018     No components found for: \"CHOLHDL\"  Lab Results   Component Value Date    HGBA1C 5.8 04/01/2019       Assessment/Plan  Problem List Items Addressed This Visit       Pneumonia of lower lobe due to infectious organism, unspecified laterality    Chronic atrial fibrillation (Multi)    HFrEF (heart failure with reduced ejection fraction) (Multi)       Electronically Signed By: Timothy Mendes MD   6/30/24  8:46 PM  "

## 2024-06-07 ENCOUNTER — NURSING HOME VISIT (OUTPATIENT)
Dept: POST ACUTE CARE | Facility: EXTERNAL LOCATION | Age: 89
End: 2024-06-07
Payer: MEDICARE

## 2024-06-07 DIAGNOSIS — I50.9 CONGESTIVE HEART FAILURE, UNSPECIFIED HF CHRONICITY, UNSPECIFIED HEART FAILURE TYPE (MULTI): ICD-10-CM

## 2024-06-07 DIAGNOSIS — E56.9 VITAMIN DEFICIENCY: ICD-10-CM

## 2024-06-07 DIAGNOSIS — G25.81 RLS (RESTLESS LEGS SYNDROME): ICD-10-CM

## 2024-06-07 DIAGNOSIS — M62.81 MUSCLE WEAKNESS (GENERALIZED): Primary | ICD-10-CM

## 2024-06-07 DIAGNOSIS — N32.81 OAB (OVERACTIVE BLADDER): ICD-10-CM

## 2024-06-07 DIAGNOSIS — I10 ESSENTIAL HYPERTENSION: ICD-10-CM

## 2024-06-07 DIAGNOSIS — J98.01 BRONCHOSPASM: ICD-10-CM

## 2024-06-07 DIAGNOSIS — I48.91 ATRIAL FIBRILLATION, UNSPECIFIED TYPE (MULTI): ICD-10-CM

## 2024-06-07 DIAGNOSIS — F32.A DEPRESSION, UNSPECIFIED DEPRESSION TYPE: ICD-10-CM

## 2024-06-07 PROCEDURE — 99310 SBSQ NF CARE HIGH MDM 45: CPT | Performed by: NURSE PRACTITIONER

## 2024-06-10 ENCOUNTER — NURSING HOME VISIT (OUTPATIENT)
Dept: POST ACUTE CARE | Facility: EXTERNAL LOCATION | Age: 89
End: 2024-06-10
Payer: MEDICARE

## 2024-06-10 DIAGNOSIS — D64.9 ANEMIA, UNSPECIFIED TYPE: ICD-10-CM

## 2024-06-10 DIAGNOSIS — I50.20 HFREF (HEART FAILURE WITH REDUCED EJECTION FRACTION) (MULTI): ICD-10-CM

## 2024-06-10 PROCEDURE — 99309 SBSQ NF CARE MODERATE MDM 30: CPT | Performed by: FAMILY MEDICINE

## 2024-06-10 NOTE — LETTER
Patient: Constance Apodaca  : 1926    Encounter Date: 06/10/2024    Resident seen 6/10/24 -- MP    CC: SNF ([facility]) recheck    : 1926  Admit H&P done 7/15/22, 24  NKDA  previously DNR-CCA () changed to FULL CODE    S: 89 yo woman with COPD, HFrEF (20%), HTN, GERD, AFIB, s/p remote Left femur fx s/p HA () and recent PNA. No CP/SOB. Med list & problem list reviewed.    O: VSS AFEB 101# (up 2#, still down 16# from ) 117# Awake, alert, pleasantly confused. Dry MM, + skin tenting. Chest cta. Heart irreg irreg. Ext no c/c/e.    LAB (24) CBC, BNP scheduled.  (6/10/24) Alb 2.8, Cr 0.92->1.19, BUN 12, GFR 57, Na 153, K 4.8, Hgb 10.3, dig 0.9, Ferritin 259, iron 31    BIMS () 15/15    A/P:  # Weakness/sarcopenia: SNF PT/OT  # PCM/Decreased appetite: add TID dietary supplements after meals. continue mirtazepine 7.5 mg.  # AFIB: eliquis 2.5 bid, rate controlled with digoxin and BB.  # HFrEF: digoxin, ARB, BB -- DC lasix 6/10/24  # FEN: KCl, Ca+D  # HLD: statin  # anemia of chronic disease: stable. No iron deficiency.  #  hx Left femur fx s/p Left HA.  # Hx CAP: completed Levaquin/Azithro 24.      Electronically Signed By: Timothy Mendes MD   24  8:47 PM

## 2024-06-10 NOTE — PROGRESS NOTES
*Provider Impression*  Patient is a 89 year old female who is seen today for management of multiple medical problems  #Weakness / RLS - PT/OT, pramipexole 0.125mg QHS  #A-fib / HTN / CHF - KCl 20mEq daily, losartan 100mg daily, eliquis 2.5mg BID, atorvastatin 10mg daily, metoprolol XL 100mg BID, furosemide 20mg daily, digoxin 125mcg M/Tu/Th/F/Sa  #OAB - oxybutynin ER 5mg BID PRN  #Bronchospasm - duoneb q4h PRN  #Depression - mirtazapine 7.5mg daily  #Vitamin deficiency - calcium+D 600mg/400unis daily  #ACP - DNRCC-A  Follow up as needed      *Chief Complaint*  A-fib     *History of Present Illness*  Pt is a 89 y/o female w/ PMH as below who presented to the ED and was admitted to hospital with A-fib RVR.  Was felt that she likely had developed pneumonia which exacerbated this.  She was given fluids and did slowly improve things.  She did require a short course of oral diltiazem to help keep the heart rate under control, which stabilized. She was converted to PO antibiotics.  She was also follow-up with her primary care physician as well as her cardiologist for further evaluation of this heart rhythm. She was seen by PT/OT who recommended SNF so she was d/c to [facility] on 6/3.    Her labs appreciated as below.    She is seen sitting up in her room today and denies any pain, no f/c, sweats, CP, SOB, cough, n/v, constipation, diarrhea, HA, dizziness, edema, or any other new c/o presently.      Allergies - NKDA  PMH - A-fib, CHF, CKD, GERD, HLD, HTN, RLS, PVD, SBO, NICM  PSH - Fem/pop bypass graft, JAQUELINE/BSO  FH - Mother had brain tumor, heart disease  SocHx - Never smoker, No EtOH      *Review of Systems*  All other systems reviewed are negative except as noted in the HPI      *Vitals*  Date: 6/6/24 - T: 97.8 P: 95 R: 18 BP: 145/76 SpO2: 92% on RA      *Results/Data*  CBC - Date: 6/5/24 WBC: 6.97 HGB: 10.7 HCT: 32.9 PLT: 243 ;   BMP - Date: 6/5/24 Na: 139 K: 3.8 Cl: 106 CO2: 22 BUN: 12 Cr: 0.92 Glu: 84 Ca: 8.4 ;    LFT - Date: 6/5/24 AST: 27 ALT: 14 ALP: 54 Tbili: 0.7 ;   Coags - Date: INR: PT:    *Physical Exam*  Gen: (+) NAD, (+) well-appearing  HEENT: (+) normocephalic, (+) MMM  Neck: (+) supple  Lungs: (+) CTAB, (-) wheezes, (-) rales, (-) rhonchi  Heart: (+) RRR, (+) S1 S2, (-) murmurs  Pulses: (+) palpable  Abd: (+) soft, (+) NT, (+) ND, (+) BS+  Ext: (-) edema, (-) deformity  MSK: (-) joint swelling  Skin: (+) warm, (+) dry, (-) rash  Neuro: (+) follows commands, (-) tremor, (+) alert

## 2024-06-11 NOTE — DOCUMENTATION CLARIFICATION NOTE
"    [facility]:               [id number]  MRN:                       [id number]  :                       [id number]  ADMIT DATE:       2024 11:45 AM  DISCH DATE:        6/3/2024 8:38 PM  RESPONDING PROVIDER #:        [id number]          PROVIDER RESPONSE TEXT:    Severe Protein Calorie Malnutrition    CDI QUERY TEXT:    Clarification    Question: Please validate this patients nutritional status as:    When answering this query, please exercise your independent professional judgment. The fact that a question is being asked, does not imply that any particular answer is desired or expected.    The patient's clinical indicators include:  89 y/o female admitted - 6/3, treated for pneumonia. Validation is requested for documentation of malnutrition.    6/3 Nutrition note, Makayla Diggs RDN, LD: \" Severe malnutrition related to chronic disease or condition  As Evidenced by: moderate subcutaneous fat loss, moderate muscle wasting and report of poor intake prior to hospital admit\"    Clinical Indicators:  BMI 16.8, mild-moderate orbital fat loss, mild moderate temporalis/pectoralis muscle wasting, poor intake less than 50 percent    Treatment: Nutrition consult, Ensure Clear supplements TID, labs/weight/intake monitoring    Risk Factors: dementia, CHF, acute illness, decreased appetite  Options provided:  -- Severe Protein Calorie Malnutrition  -- Moderate Protein Calorie Malnutrition  -- Protein Calorie Malnutrition, Please specify severity  -- Other - I will add my own diagnosis  -- Refer to Clinical Documentation Reviewer    Query created by: Carol Haro on 2024 1:28 PM      Electronically signed by:  ILANA MAGUIRE DO 2024 8:28 AM          "

## 2024-06-13 ENCOUNTER — NURSING HOME VISIT (OUTPATIENT)
Dept: POST ACUTE CARE | Facility: EXTERNAL LOCATION | Age: 89
End: 2024-06-13
Payer: MEDICARE

## 2024-06-13 DIAGNOSIS — J98.01 BRONCHOSPASM: ICD-10-CM

## 2024-06-13 DIAGNOSIS — I10 ESSENTIAL HYPERTENSION: ICD-10-CM

## 2024-06-13 DIAGNOSIS — N32.81 OAB (OVERACTIVE BLADDER): ICD-10-CM

## 2024-06-13 DIAGNOSIS — G25.81 RLS (RESTLESS LEGS SYNDROME): ICD-10-CM

## 2024-06-13 DIAGNOSIS — I48.91 ATRIAL FIBRILLATION, UNSPECIFIED TYPE (MULTI): ICD-10-CM

## 2024-06-13 DIAGNOSIS — I50.9 CONGESTIVE HEART FAILURE, UNSPECIFIED HF CHRONICITY, UNSPECIFIED HEART FAILURE TYPE (MULTI): ICD-10-CM

## 2024-06-13 DIAGNOSIS — E56.9 VITAMIN DEFICIENCY: ICD-10-CM

## 2024-06-13 DIAGNOSIS — M62.81 MUSCLE WEAKNESS (GENERALIZED): Primary | ICD-10-CM

## 2024-06-13 DIAGNOSIS — F32.A DEPRESSION, UNSPECIFIED DEPRESSION TYPE: ICD-10-CM

## 2024-06-13 PROCEDURE — 99309 SBSQ NF CARE MODERATE MDM 30: CPT | Performed by: NURSE PRACTITIONER

## 2024-06-18 NOTE — PROGRESS NOTES
*Provider Impression*  Patient is a 89 year old female who is seen today for management of multiple medical problems  #Weakness / RLS - PT/OT, pramipexole 0.125mg QHS  #A-fib / HTN / CHF - KCl 20mEq daily, losartan 100mg daily, eliquis 2.5mg BID, atorvastatin 10mg daily, metoprolol XL 100mg BID, digoxin 125mcg M/Tu/Th/F/Sa, CBC, CMP on 6/17  #OAB - oxybutynin ER 5mg BID PRN  #Bronchospasm - duoneb q4h PRN  #Depression - mirtazapine 7.5mg daily  #Anemia / Vitamin deficiency - calcium+D 600mg/400unis daily, ferous sulfate 325mg daily  #ACP - DNRCC-A  Follow up as needed      *Chief Complaint*  A-fib     *History of Present Illness*  Pt is a 89 y/o female w/ PMH as below who presented to the ED and was admitted to hospital with A-fib RVR.  Was felt that she likely had developed pneumonia which exacerbated this.  She was given fluids and did slowly improve things.  She did require a short course of oral diltiazem to help keep the heart rate under control, which stabilized. She was converted to PO antibiotics.  She was also follow-up with her primary care physician as well as her cardiologist for further evaluation of this heart rhythm. She was seen by PT/OT who recommended SNF so she was d/c to [facility] on 6/3.    Labs were addressed on 6/10 - Na up, Cr up, taken off lasix, w/ repeat labs for 6/17, iron low so ferrous sulfate added as well.    She is seen sitting up in her room today and reports no pain, f/c, sweats, CP, SOB, cough, n/v, abd pain, constipation, diarrhea, LUTS, HA, dizziness, or any other new c/o presently.      Allergies - NKDA  PMH - A-fib, CHF, CKD, GERD, HLD, HTN, RLS, PVD, SBO, NICM  PSH - Fem/pop bypass graft, JAQUELINE/BSO  FH - Mother had brain tumor, heart disease  SocHx - Never smoker, No EtOH      *Review of Systems*  All other systems reviewed are negative except as noted in the HPI      *Vitals*  Date: 6/13/24 - T: 97.6 P: 87 R: 18 BP: 130/77 SpO2: 93% on RA      *Results/Data*  CBC - Date:  6/10/24 WBC: 8.45 HGB: 10.3 HCT: 32.3 PLT: 276 ;   BMP - Date: 6/10/24 Na: 153 K: 4.8 Cl: 117 CO2: 24 BUN: 22 Cr: 1.19 Glu: 59 Ca: 9.7 ;   LFT - Date: 6/10/24 AST: 14 ALT: 14 ALP: 172 Tbili: 0.2 ;   Coags - Date: INR: PT:  Other - Date: 6/10/24  Iron: 31  Ferritin: 258.9  B12: 632  Digoxin: 0.9    *Physical Exam*  Gen: (+) NAD, (+) well-appearing  HEENT: (+) normocephalic, (+) MMM  Neck: (+) supple  Lungs: (+) CTAB, (-) wheezes, (-) rales, (-) rhonchi  Heart: (+) RRR, (+) S1 S2, (-) murmurs  Pulses: (+) palpable  Abd: (+) soft, (+) NT, (+) ND, (+) BS+  Ext: (-) edema, (-) deformity  MSK: (-) joint swelling  Skin: (+) warm, (+) dry, (-) rash  Neuro: (+) follows commands, (-) tremor, (+) alert

## 2024-06-19 PROBLEM — I50.20 HFREF (HEART FAILURE WITH REDUCED EJECTION FRACTION) (MULTI): Status: ACTIVE | Noted: 2024-06-19

## 2024-06-19 PROBLEM — I48.20 CHRONIC ATRIAL FIBRILLATION (MULTI): Status: ACTIVE | Noted: 2024-06-19

## 2024-06-19 PROBLEM — D64.9 ANEMIA: Status: ACTIVE | Noted: 2024-06-19

## 2024-06-19 NOTE — PROGRESS NOTES
Resident seen 6/10/24 -- MP    CC: SNF ([facility]) recheck    : 1926  Admit H&P done 7/15/22, 24  NKDA  previously DNR-CCA () changed to FULL CODE    S: 89 yo woman with COPD, HFrEF (20%), HTN, GERD, AFIB, s/p remote Left femur fx s/p HA () and recent PNA. No CP/SOB. Med list & problem list reviewed.    O: VSS AFEB 101# (up 2#, still down 16# from ) 117# Awake, alert, pleasantly confused. Dry MM, + skin tenting. Chest cta. Heart irreg irreg. Ext no c/c/e.    LAB (24) CBC, BNP scheduled.  (6/10/24) Alb 2.8, Cr 0.92->1.19, BUN 12, GFR 57, Na 153, K 4.8, Hgb 10.3, dig 0.9, Ferritin 259, iron 31    BIMS () 15/15    A/P:  # Weakness/sarcopenia: SNF PT/OT  # PCM/Decreased appetite: add TID dietary supplements after meals. continue mirtazepine 7.5 mg.  # AFIB: eliquis 2.5 bid, rate controlled with digoxin and BB.  # HFrEF: digoxin, ARB, BB -- DC lasix 6/10/24  # FEN: KCl, Ca+D  # HLD: statin  # anemia of chronic disease: stable. No iron deficiency.  #  hx Left femur fx s/p Left HA.  # Hx CAP: completed Levaquin/Azithro 24.   Banner Transposition Flap Text: The defect edges were debeveled with a #15 scalpel blade.  Given the location of the defect and the proximity to free margins a Banner transposition flap was deemed most appropriate.  Using a sterile surgical marker, an appropriate flap drawn around the defect. The area thus outlined was incised deep to adipose tissue with a #15 scalpel blade.  The skin margins were undermined to an appropriate distance in all directions utilizing iris scissors.

## 2024-06-19 NOTE — PROGRESS NOTES
Constance Apodaca is a 89 y.o. female with Chief Complaint of SNF ([facility]) H&P    Resident seen 24 -- MP    CC: SNF ([facility] H&P    : 1926  Admit H&P done 7/15/22, 24  NKDA  previously DNR-CCA () changed to FULL CODE    S: 89 yo woman with COPD, HFrEF (20%), HTN, GERD, AFIB, s/p remote Left femur fx s/p HA () admitted to SNF rehab after hospitalization for PNA. Completing abx today. No CP/SOB. Med list & problem list reviewed.    O: VSS AFEB 99# (down 18# from ) 117# Awake, alert, pleasantly confused. Dry MM, + skin tenting. Chest cta. Heart irreg irreg. Ext no c/c/e.    LAB (6/10/24) Pending CMP/CBC/Dig/Fe/Ferritin/B12  (24) Alb 2.8, Cr 0.92, BUN 12, GFR 57, Na 139, K 3.8, Hgb 10.7    BIMS () 15/15    A/P:  # Weakness/sarcopenia: SNF PT/OT  # CAP: complete Levaquin/Azithro 24, cut duonebs back to PRN only.  # Clinically volume contracted: hold lasix -6/10/24.  # PCM/Decreased appetite: add TID dietary supplements after meals. continue mirtazepine 7.5 mg.  # AFIB: eliquis 2.5 bid, rate controlled with digoxin and BB.  # HFrEF: digoxin, lasix 20, ARB, BB  # FEN: KCl, Ca+D  # HLD: statin  # anemia: stable. Check indices 6/10/24.  #  hx Left femur fx s/p Left HA.      Past Surgical History:   Procedure Laterality Date    OTHER SURGICAL HISTORY  2013    Bypass Graft Using Vein: Femoral-popliteal    TOTAL ABDOMINAL HYSTERECTOMY W/ BILATERAL SALPINGOOPHORECTOMY  07/10/2015    Total Abdominal Hysterectomy With Removal Of Both Ovaries      Social History     Socioeconomic History    Marital status:      Spouse name: Not on file    Number of children: Not on file    Years of education: Not on file    Highest education level: Not on file   Occupational History    Not on file   Tobacco Use    Smoking status: Not on file    Smokeless tobacco: Not on file   Substance and Sexual Activity    Alcohol use: Not on file    Drug use: Not on file    Sexual activity: Not on file    Other Topics Concern    Not on file   Social History Narrative    Not on file     Social Determinants of Health     Financial Resource Strain: Patient Declined (5/30/2024)    Overall Financial Resource Strain (CARDIA)     Difficulty of Paying Living Expenses: Patient declined   Food Insecurity: Not on file   Transportation Needs: No Transportation Needs (5/31/2024)    PRAPARE - Transportation     Lack of Transportation (Medical): No     Lack of Transportation (Non-Medical): No   Physical Activity: Not on file   Stress: Not on file   Social Connections: Not on file   Intimate Partner Violence: Not on file   Housing Stability: Low Risk  (5/31/2024)    Housing Stability Vital Sign     Unable to Pay for Housing in the Last Year: No     Number of Places Lived in the Last Year: 1     Unstable Housing in the Last Year: No     Past Medical History:   Diagnosis Date    Atherosclerotic heart disease of native coronary artery without angina pectoris 10/12/2017    Coronary sclerosis    Chronic systolic (congestive) heart failure (Multi) 04/18/2022    Chronic systolic heart failure, ACC/AHA stage C    Disorder of lacrimal system, unspecified 11/08/2018    Defect of lacrimal duct    Encounter for immunization 10/08/2020    Influenza vaccine administered    Fracture of unspecified part of neck of unspecified femur, initial encounter for closed fracture (Multi) 02/18/2021    Femoral neck fracture    Hypokalemia 08/01/2018    Low serum potassium    Other cardiomyopathies (Multi) 02/14/2022    NICM (nonischemic cardiomyopathy)    Other conditions influencing health status     Osteoarthritis    Other injury of unspecified body region, initial encounter 02/14/2020    Contusion of bone    Other specified abnormal findings of blood chemistry 08/12/2014    Azotemia    Other specified disorders of eye and adnexa 11/08/2018    Red eye    Other specified health status 09/28/2017    Influenza vaccination ordered    Pain in right hip  02/14/2020    Hip pain, right    Pain in unspecified foot 02/14/2014    Foot pain    Pain in unspecified hip 03/22/2018    Joint pain, hip    Pain in unspecified knee     Knee pain    Personal history of diseases of the skin and subcutaneous tissue 02/14/2014    History of ingrown nail    Personal history of other diseases of the circulatory system 07/09/2018    History of atrial fibrillation    Personal history of other diseases of the circulatory system 04/19/2018    History of congestive heart failure    Personal history of other diseases of the circulatory system 03/22/2018    History of sinus bradycardia    Personal history of other diseases of the circulatory system     History of hypertension    Personal history of other diseases of the circulatory system     History of peripheral vascular disease    Personal history of other diseases of the digestive system 05/23/2019    History of small bowel obstruction    Personal history of other diseases of the musculoskeletal system and connective tissue 06/29/2015    History of backache    Personal history of other diseases of the nervous system and sense organs 11/08/2018    History of conjunctivitis    Personal history of other diseases of the nervous system and sense organs 06/16/2016    History of hearing loss    Personal history of other diseases of the nervous system and sense organs 11/17/2016    History of tinnitus    Personal history of other diseases of the nervous system and sense organs 12/29/2017    History of redness of eye    Personal history of other endocrine, nutritional and metabolic disease 04/14/2016    History of hypokalemia    Personal history of other endocrine, nutritional and metabolic disease     History of hyperlipidemia    Personal history of other infectious and parasitic diseases     History of candidal vulvovaginitis    Personal history of other mental and behavioral disorders 08/16/2018    History of depression    Personal history of  other specified conditions 09/13/2018    History of fatigue    Personal history of other specified conditions 07/10/2015    History of urinary frequency    Personal history of other specified conditions 03/13/2019    History of abdominal pain    Personal history of other specified conditions 03/15/2020    History of dyspnea    Personal history of other specified conditions 04/25/2022    History of urinary urgency    Personal history of pneumonia (recurrent)     History of pneumonia    Personal history of urinary (tract) infections 11/12/2021    History of urinary tract infection    Personal history of urinary (tract) infections 07/02/2020    History of urinary tract infection    Pneumonitis due to inhalation of food and vomit (Multi)     Aspiration pneumonia of right lower lobe due to regurgitated food    Presence of unspecified artificial hip joint 10/22/2019    Status post hip replacement    Presence of unspecified artificial hip joint 03/22/2018    Status post hip replacement    Rash and other nonspecific skin eruption 11/05/2013    Rash    Retention of urine, unspecified 07/17/2015    Incomplete bladder emptying    Unilateral primary osteoarthritis, right knee     Osteoarthritis of right knee    Unspecified fall, initial encounter 02/15/2018    Fall, accidental      No family history on file.     Review of Systems   Constitutional:  Negative for chills, fatigue and fever.   HENT:  Negative for rhinorrhea and sore throat.    Eyes:  Negative for pain and redness.   Respiratory:  Negative for cough and shortness of breath.    Cardiovascular:  Negative for chest pain and palpitations.   Gastrointestinal:  Negative for abdominal pain and blood in stool.   Endocrine: Negative for polydipsia and polyuria.   Genitourinary:  Negative for dysuria and hematuria.   Musculoskeletal:  Negative for back pain and neck stiffness.   Skin:  Negative for rash and wound.   Allergic/Immunologic: Negative for environmental allergies  and food allergies.   Neurological:  Positive for weakness. Negative for headaches.   Hematological:  Negative for adenopathy. Does not bruise/bleed easily.   Psychiatric/Behavioral:  Positive for confusion. Negative for hallucinations and suicidal ideas.       There were no vitals taken for this visit.  Physical Exam  Vitals reviewed.   Constitutional:       General: She is not in acute distress.     Appearance: She is not ill-appearing.   HENT:      Head: Normocephalic and atraumatic.      Right Ear: Tympanic membrane normal.      Left Ear: Tympanic membrane normal.      Nose: No congestion or rhinorrhea.      Mouth/Throat:      Mouth: Mucous membranes are dry.      Pharynx: No oropharyngeal exudate or posterior oropharyngeal erythema.   Eyes:      Extraocular Movements: Extraocular movements intact.      Conjunctiva/sclera: Conjunctivae normal.      Pupils: Pupils are equal, round, and reactive to light.   Cardiovascular:      Rate and Rhythm: Normal rate. Rhythm irregular.      Heart sounds: No murmur heard.     No friction rub. No gallop.   Pulmonary:      Effort: Pulmonary effort is normal.      Breath sounds: Normal breath sounds. No wheezing, rhonchi or rales.   Abdominal:      General: There is no distension.      Palpations: Abdomen is soft.      Tenderness: There is no abdominal tenderness. There is no guarding or rebound.   Musculoskeletal:         General: No swelling or deformity.      Cervical back: Normal range of motion and neck supple.      Right lower leg: No edema.      Left lower leg: No edema.   Skin:     Capillary Refill: Capillary refill takes less than 2 seconds.      Coloration: Skin is not jaundiced.      Findings: No rash.   Neurological:      General: No focal deficit present.      Mental Status: She is alert.      Motor: Weakness present.   Psychiatric:         Mood and Affect: Mood normal.         Behavior: Behavior normal.       Lab Results   Component Value Date    WBC 11.5 (H)  "06/01/2024    HGB 10.8 (L) 06/01/2024    HCT 32.4 (L) 06/01/2024    MCV 95 06/01/2024     (L) 06/01/2024     Lab Results   Component Value Date    CHOL 103 04/20/2018     Lab Results   Component Value Date    HDL 20.5 (A) 04/20/2018     No results found for: \"LDLCALC\"  Lab Results   Component Value Date    TRIG 103 04/20/2018     No components found for: \"CHOLHDL\"  Lab Results   Component Value Date    HGBA1C 5.8 04/01/2019       Assessment/Plan   Problem List Items Addressed This Visit       Pneumonia of lower lobe due to infectious organism, unspecified laterality    Chronic atrial fibrillation (Multi)    HFrEF (heart failure with reduced ejection fraction) (Multi)       "

## 2024-06-30 ASSESSMENT — ENCOUNTER SYMPTOMS
WOUND: 0
ADENOPATHY: 0
HALLUCINATIONS: 0
NECK STIFFNESS: 0
CONFUSION: 1
SHORTNESS OF BREATH: 0
COUGH: 0
EYE REDNESS: 0
FEVER: 0
DYSURIA: 0
POLYDIPSIA: 0
PALPITATIONS: 0
FATIGUE: 0
BLOOD IN STOOL: 0
ABDOMINAL PAIN: 0
EYE PAIN: 0
BRUISES/BLEEDS EASILY: 0
WEAKNESS: 1
RHINORRHEA: 0
SORE THROAT: 0
BACK PAIN: 0
HEADACHES: 0
HEMATURIA: 0
CHILLS: 0

## 2024-07-06 ENCOUNTER — TELEPHONE (OUTPATIENT)
Dept: PRIMARY CARE | Facility: CLINIC | Age: 89
End: 2024-07-06
Payer: MEDICARE

## 2024-07-06 DIAGNOSIS — N39.0 URINARY TRACT INFECTION WITHOUT HEMATURIA, SITE UNSPECIFIED: Primary | ICD-10-CM

## 2024-07-06 RX ORDER — NITROFURANTOIN 25; 75 MG/1; MG/1
100 CAPSULE ORAL 2 TIMES DAILY
Qty: 10 CAPSULE | Refills: 0 | Status: SHIPPED | OUTPATIENT
Start: 2024-07-06 | End: 2024-07-11

## 2024-07-08 ENCOUNTER — TELEPHONE (OUTPATIENT)
Dept: PRIMARY CARE | Facility: CLINIC | Age: 89
End: 2024-07-08
Payer: MEDICARE

## 2024-07-08 NOTE — TELEPHONE ENCOUNTER
"Patient daughter (Claudia) called.  About 2 weeks ago her mom was delusional and confused and was seeing children that weren't there.  Had a UTI.  Was given antibiotics.  Experienced the same symptoms again and was given another course of antibiotics from \"the doctor that was covering.\"  Claudia is requesting a return call to discuss this [phone]  "

## 2024-07-08 NOTE — TELEPHONE ENCOUNTER
Spoke with daughter on 7/6 who reported that patient was having hallucinations and hearing things, similar to when she had a UTI. Daughter is hesitant to have to take her to the ED. Daughter reports no fever or URI symptoms. Will treat with antibiotic for presumptive UTI.    I called to check on the patient on 7/7 and daughter reports she was doing better.

## 2024-07-09 NOTE — TELEPHONE ENCOUNTER
Patient daughter Claudia spoke with Doctor Sin 7/6/24, and 7/5/24. Daughter explains patient is okay after she takes medication. I explain to daughter patient might need to go to ER for further evaluation. Patient is not having pain, discharged, frequency, urgency at this time. Daughter declines just wants another medication for symptoms. Provider notified.

## 2024-07-10 ENCOUNTER — TELEPHONE (OUTPATIENT)
Dept: PRIMARY CARE | Facility: CLINIC | Age: 89
End: 2024-07-10
Payer: MEDICARE

## 2024-07-10 NOTE — TELEPHONE ENCOUNTER
pT MATIR CALLING AGAIN, STATES THAT SHE HAS NOT SPOKEN TO ANYONE IN 3 DAYS. bUT i DO SEE IN THE MESSAGES THAT SHE DID SPEAK WITH SOMEONE. pT IS DAUGHTER IS VERY UPSET THAT NO HAS CALLED HER??     Patient daughter was not nice and very nasty. She wants the Nurse or Dr. Lewis to call her ASAP!!!!!!!

## 2024-07-11 DIAGNOSIS — N39.0 URINARY TRACT INFECTION WITHOUT HEMATURIA, SITE UNSPECIFIED: ICD-10-CM

## 2024-07-11 NOTE — TELEPHONE ENCOUNTER
Spoke with son today. I advice son to take to ER or urgent care. Family does not want to take patient to ER or urgent care. I informed son that on call MD, and myself has talk to Claudia (sister) in the recent days. Son was not aware. Son will like patient to get urine collected at lab. Provider notified, order placed in system.

## 2024-07-12 ENCOUNTER — LAB (OUTPATIENT)
Dept: LAB | Facility: LAB | Age: 89
End: 2024-07-12
Payer: MEDICARE

## 2024-07-12 DIAGNOSIS — N39.0 URINARY TRACT INFECTION WITHOUT HEMATURIA, SITE UNSPECIFIED: ICD-10-CM

## 2024-07-12 LAB
APPEARANCE UR: CLEAR
BACTERIA #/AREA URNS AUTO: ABNORMAL /HPF
BILIRUB UR STRIP.AUTO-MCNC: NEGATIVE MG/DL
COLOR UR: YELLOW
GLUCOSE UR STRIP.AUTO-MCNC: NORMAL MG/DL
HYALINE CASTS #/AREA URNS AUTO: ABNORMAL /LPF
KETONES UR STRIP.AUTO-MCNC: NEGATIVE MG/DL
LEUKOCYTE ESTERASE UR QL STRIP.AUTO: ABNORMAL
MUCOUS THREADS #/AREA URNS AUTO: ABNORMAL /LPF
NITRITE UR QL STRIP.AUTO: NEGATIVE
PH UR STRIP.AUTO: 7 [PH]
PROT UR STRIP.AUTO-MCNC: NEGATIVE MG/DL
RBC # UR STRIP.AUTO: NEGATIVE /UL
RBC #/AREA URNS AUTO: ABNORMAL /HPF
SP GR UR STRIP.AUTO: 1.01
SQUAMOUS #/AREA URNS AUTO: ABNORMAL /HPF
UROBILINOGEN UR STRIP.AUTO-MCNC: NORMAL MG/DL
WBC #/AREA URNS AUTO: ABNORMAL /HPF

## 2024-07-12 PROCEDURE — 81001 URINALYSIS AUTO W/SCOPE: CPT

## 2024-07-12 PROCEDURE — 87186 SC STD MICRODIL/AGAR DIL: CPT

## 2024-07-12 PROCEDURE — 87086 URINE CULTURE/COLONY COUNT: CPT

## 2024-07-14 LAB — BACTERIA UR CULT: ABNORMAL

## 2024-07-15 DIAGNOSIS — N39.0 URINARY TRACT INFECTION WITHOUT HEMATURIA, SITE UNSPECIFIED: Primary | ICD-10-CM

## 2024-07-15 RX ORDER — PSYLLIUM HUSK 3.4 G/5.8G
POWDER ORAL
COMMUNITY
Start: 2019-05-23

## 2024-07-15 RX ORDER — CIPROFLOXACIN 500 MG/1
500 TABLET ORAL 2 TIMES DAILY
Qty: 28 TABLET | Refills: 0 | Status: SHIPPED | OUTPATIENT
Start: 2024-07-15 | End: 2024-07-29

## 2024-07-15 NOTE — PROGRESS NOTES
Urine culture has serratia and sensitive to ciprofloxacin 500 mg BID x 7 days.  Her son in law also noted he did not want to start lipitor for her which was prescribed at the nursing home and told him that was reasonable.

## 2024-07-26 ENCOUNTER — TELEPHONE (OUTPATIENT)
Dept: PRIMARY CARE | Facility: CLINIC | Age: 89
End: 2024-07-26
Payer: MEDICARE

## 2024-07-26 DIAGNOSIS — I50.20 HFREF (HEART FAILURE WITH REDUCED EJECTION FRACTION) (MULTI): Primary | ICD-10-CM

## 2024-07-26 RX ORDER — DIGOXIN 125 MCG
125 TABLET ORAL
Qty: 90 TABLET | Refills: 3 | Status: SHIPPED | OUTPATIENT
Start: 2024-07-27

## 2024-07-29 ENCOUNTER — TELEPHONE (OUTPATIENT)
Dept: PRIMARY CARE | Facility: CLINIC | Age: 89
End: 2024-07-29
Payer: MEDICARE

## 2024-07-29 NOTE — TELEPHONE ENCOUNTER
Please can you send in her digoxin. Son in law has stated that the pharm does not have it there and I seen it was sent over on 7/27/24. Him and the sister or wife have called the office numerous time this deric

## 2024-08-07 DIAGNOSIS — I48.91 ATRIAL FIBRILLATION, UNSPECIFIED TYPE (MULTI): ICD-10-CM

## 2024-08-14 ENCOUNTER — HOME VISIT (OUTPATIENT)
Dept: POST ACUTE CARE | Facility: EXTERNAL LOCATION | Age: 89
End: 2024-08-14
Payer: MEDICARE

## 2024-08-14 DIAGNOSIS — G30.1 MILD LATE ONSET ALZHEIMER'S DEMENTIA WITHOUT BEHAVIORAL DISTURBANCE, PSYCHOTIC DISTURBANCE, MOOD DISTURBANCE, OR ANXIETY (MULTI): ICD-10-CM

## 2024-08-14 DIAGNOSIS — I48.20 CHRONIC ATRIAL FIBRILLATION (MULTI): ICD-10-CM

## 2024-08-14 DIAGNOSIS — E78.5 DYSLIPIDEMIA: ICD-10-CM

## 2024-08-14 DIAGNOSIS — Z00.00 ROUTINE GENERAL MEDICAL EXAMINATION AT HEALTH CARE FACILITY: Primary | ICD-10-CM

## 2024-08-14 DIAGNOSIS — I10 ESSENTIAL (PRIMARY) HYPERTENSION: ICD-10-CM

## 2024-08-14 DIAGNOSIS — G91.2 NPH (NORMAL PRESSURE HYDROCEPHALUS) (MULTI): ICD-10-CM

## 2024-08-14 DIAGNOSIS — F02.A0 MILD LATE ONSET ALZHEIMER'S DEMENTIA WITHOUT BEHAVIORAL DISTURBANCE, PSYCHOTIC DISTURBANCE, MOOD DISTURBANCE, OR ANXIETY (MULTI): ICD-10-CM

## 2024-08-14 DIAGNOSIS — I50.20 HFREF (HEART FAILURE WITH REDUCED EJECTION FRACTION) (MULTI): ICD-10-CM

## 2024-08-14 PROCEDURE — 99344 HOME/RES VST NEW MOD MDM 60: CPT | Performed by: FAMILY MEDICINE

## 2024-08-14 PROCEDURE — G0439 PPPS, SUBSEQ VISIT: HCPCS | Performed by: FAMILY MEDICINE

## 2024-08-15 ENCOUNTER — APPOINTMENT (OUTPATIENT)
Dept: PRIMARY CARE | Facility: CLINIC | Age: 89
End: 2024-08-15
Payer: MEDICARE

## 2024-08-15 VITALS
DIASTOLIC BLOOD PRESSURE: 69 MMHG | HEART RATE: 71 BPM | HEIGHT: 63 IN | OXYGEN SATURATION: 99 % | RESPIRATION RATE: 18 BRPM | WEIGHT: 95 LBS | BODY MASS INDEX: 16.83 KG/M2 | TEMPERATURE: 97.1 F | SYSTOLIC BLOOD PRESSURE: 132 MMHG

## 2024-08-15 PROBLEM — G91.2 NPH (NORMAL PRESSURE HYDROCEPHALUS) (MULTI): Status: ACTIVE | Noted: 2024-08-15

## 2024-08-15 PROBLEM — G30.1 MILD LATE ONSET ALZHEIMER'S DEMENTIA WITHOUT BEHAVIORAL DISTURBANCE, PSYCHOTIC DISTURBANCE, MOOD DISTURBANCE, OR ANXIETY (MULTI): Status: ACTIVE | Noted: 2024-08-15

## 2024-08-15 PROBLEM — F02.A0 MILD LATE ONSET ALZHEIMER'S DEMENTIA WITHOUT BEHAVIORAL DISTURBANCE, PSYCHOTIC DISTURBANCE, MOOD DISTURBANCE, OR ANXIETY (MULTI): Status: ACTIVE | Noted: 2024-08-15

## 2024-08-15 ASSESSMENT — COLUMBIA-SUICIDE SEVERITY RATING SCALE - C-SSRS
1. IN THE PAST MONTH, HAVE YOU WISHED YOU WERE DEAD OR WISHED YOU COULD GO TO SLEEP AND NOT WAKE UP?: NO
6. HAVE YOU EVER DONE ANYTHING, STARTED TO DO ANYTHING, OR PREPARED TO DO ANYTHING TO END YOUR LIFE?: NO
2. HAVE YOU ACTUALLY HAD ANY THOUGHTS OF KILLING YOURSELF?: NO

## 2024-08-15 ASSESSMENT — ACTIVITIES OF DAILY LIVING (ADL)
MANAGING_FINANCES: TOTAL CARE
TAKING_MEDICATION: TOTAL CARE
DOING_HOUSEWORK: TOTAL CARE
DRESSING: NEEDS ASSISTANCE
BATHING: NEEDS ASSISTANCE
GROCERY_SHOPPING: TOTAL CARE

## 2024-08-15 ASSESSMENT — PATIENT HEALTH QUESTIONNAIRE - PHQ9
1. LITTLE INTEREST OR PLEASURE IN DOING THINGS: NOT AT ALL
SUM OF ALL RESPONSES TO PHQ9 QUESTIONS 1 AND 2: 0
1. LITTLE INTEREST OR PLEASURE IN DOING THINGS: NOT AT ALL
SUM OF ALL RESPONSES TO PHQ9 QUESTIONS 1 AND 2: 0
2. FEELING DOWN, DEPRESSED OR HOPELESS: NOT AT ALL
2. FEELING DOWN, DEPRESSED OR HOPELESS: NOT AT ALL

## 2024-08-15 ASSESSMENT — ENCOUNTER SYMPTOMS
VOMITING: 0
PALPITATIONS: 0
UNEXPECTED WEIGHT CHANGE: 0
CONSTIPATION: 0

## 2024-08-15 NOTE — PROGRESS NOTES
"Subjective   Patient ID: Constance Apodaca is a 89 y.o. female who is assisted living/ home patient being seen and evaluated for multiple medical problems.    HPI   Stable  No URI symptoms or fever chills  No complaints of    Review of Systems   Constitutional:  Negative for unexpected weight change.   HENT:  Negative for congestion and ear discharge.    Cardiovascular:  Negative for chest pain and palpitations.   Gastrointestinal:  Negative for constipation and vomiting.   All other systems reviewed and are negative.      Objective   /69   Pulse 71   Temp 36.2 °C (97.1 °F)   Resp 18   Ht 1.6 m (5' 3\")   Wt (!) 43.1 kg (95 lb)   SpO2 99%   BMI 16.83 kg/m²     Physical Exam  Constitutional:       General: She is not in acute distress.     Appearance: Normal appearance.   HENT:      Head: Normocephalic and atraumatic.   Eyes:      Conjunctiva/sclera: Conjunctivae normal.   Cardiovascular:      Rate and Rhythm: Normal rate and regular rhythm.   Pulmonary:      Effort: Pulmonary effort is normal.      Breath sounds: Normal breath sounds.   Abdominal:      Palpations: Abdomen is soft.   Musculoskeletal:      Cervical back: Neck supple.   Lymphadenopathy:      Cervical: No cervical adenopathy.   Neurological:      Mental Status: She is alert.         Assessment/Plan   Problem List Items Addressed This Visit             ICD-10-CM    Chronic atrial fibrillation (Multi) I48.20     Treated and controlled         HFrEF (heart failure with reduced ejection fraction) (Multi) I50.20     Treated and controlled         Mild late onset Alzheimer's dementia without behavioral disturbance, psychotic disturbance, mood disturbance, or anxiety (Multi) G30.1, F02.A0     Patient in 24-hour care facility  Daughter is with patient         NPH (normal pressure hydrocephalus) (Multi) G91.2     Patient in wheelchair at exam          Other Visit Diagnoses         Codes    Routine general medical examination at health care facility    -  " Primary Z00.00    Essential (primary) hypertension     I10    Dyslipidemia     E78.5          HM LM discussed  Hypertension controlled  Reviewed labs  Recheck 3 months sooner if any issues arise

## 2024-08-16 DIAGNOSIS — I48.91 ATRIAL FIBRILLATION, UNSPECIFIED TYPE (MULTI): ICD-10-CM

## 2024-08-16 RX ORDER — METOPROLOL SUCCINATE 100 MG/1
100 TABLET, EXTENDED RELEASE ORAL 2 TIMES DAILY
Qty: 180 TABLET | Refills: 3 | Status: SHIPPED | OUTPATIENT
Start: 2024-08-16 | End: 2025-08-16

## 2024-08-16 RX ORDER — METOPROLOL SUCCINATE 100 MG/1
100 TABLET, EXTENDED RELEASE ORAL 2 TIMES DAILY
Qty: 180 TABLET | Refills: 3 | OUTPATIENT
Start: 2024-08-16

## 2024-09-16 ENCOUNTER — APPOINTMENT (OUTPATIENT)
Dept: CARDIOLOGY | Facility: HOSPITAL | Age: 89
End: 2024-09-16
Payer: MEDICARE

## 2024-09-16 ENCOUNTER — APPOINTMENT (OUTPATIENT)
Dept: RADIOLOGY | Facility: HOSPITAL | Age: 89
End: 2024-09-16
Payer: MEDICARE

## 2024-09-16 ENCOUNTER — HOSPITAL ENCOUNTER (EMERGENCY)
Facility: HOSPITAL | Age: 89
Discharge: HOME | End: 2024-09-16
Attending: STUDENT IN AN ORGANIZED HEALTH CARE EDUCATION/TRAINING PROGRAM
Payer: MEDICARE

## 2024-09-16 VITALS
HEIGHT: 63 IN | WEIGHT: 100.31 LBS | TEMPERATURE: 98.2 F | SYSTOLIC BLOOD PRESSURE: 142 MMHG | RESPIRATION RATE: 18 BRPM | BODY MASS INDEX: 17.77 KG/M2 | DIASTOLIC BLOOD PRESSURE: 82 MMHG | OXYGEN SATURATION: 96 % | HEART RATE: 87 BPM
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
## 2024-09-16 LAB
ALBUMIN SERPL BCP-MCNC: 4 G/DL (ref 3.4–5)
ALP SERPL-CCNC: 61 U/L (ref 33–136)
ALT SERPL W P-5'-P-CCNC: 14 U/L (ref 7–45)
ANION GAP SERPL CALC-SCNC: 13 MMOL/L (ref 10–20)
APTT PPP: 30 SECONDS (ref 27–38)
AST SERPL W P-5'-P-CCNC: 27 U/L (ref 9–39)
BASOPHILS # BLD AUTO: 0.06 X10*3/UL (ref 0–0.1)
BASOPHILS NFR BLD AUTO: 0.8 %
BILIRUB SERPL-MCNC: 0.6 MG/DL (ref 0–1.2)
BUN SERPL-MCNC: 33 MG/DL (ref 6–23)
CALCIUM SERPL-MCNC: 9.1 MG/DL (ref 8.6–10.3)
CARDIAC TROPONIN I PNL SERPL HS: 16 NG/L (ref 0–13)
CHLORIDE SERPL-SCNC: 101 MMOL/L (ref 98–107)
CO2 SERPL-SCNC: 29 MMOL/L (ref 21–32)
CREAT SERPL-MCNC: 1.33 MG/DL (ref 0.5–1.05)
EGFRCR SERPLBLD CKD-EPI 2021: 36 ML/MIN/1.73M*2
EOSINOPHIL # BLD AUTO: 0.26 X10*3/UL (ref 0–0.4)
EOSINOPHIL NFR BLD AUTO: 3.5 %
ERYTHROCYTE [DISTWIDTH] IN BLOOD BY AUTOMATED COUNT: 13.1 % (ref 11.5–14.5)
GLUCOSE BLD MANUAL STRIP-MCNC: 102 MG/DL (ref 74–99)
GLUCOSE SERPL-MCNC: 97 MG/DL (ref 74–99)
HCT VFR BLD AUTO: 39.4 % (ref 36–46)
HGB BLD-MCNC: 12.6 G/DL (ref 12–16)
IMM GRANULOCYTES # BLD AUTO: 0.02 X10*3/UL (ref 0–0.5)
IMM GRANULOCYTES NFR BLD AUTO: 0.3 % (ref 0–0.9)
INR PPP: 1.2 (ref 0.9–1.1)
LYMPHOCYTES # BLD AUTO: 1.68 X10*3/UL (ref 0.8–3)
LYMPHOCYTES NFR BLD AUTO: 22.4 %
MCH RBC QN AUTO: 32.1 PG (ref 26–34)
MCHC RBC AUTO-ENTMCNC: 32 G/DL (ref 32–36)
MCV RBC AUTO: 100 FL (ref 80–100)
MONOCYTES # BLD AUTO: 0.57 X10*3/UL (ref 0.05–0.8)
MONOCYTES NFR BLD AUTO: 7.6 %
NEUTROPHILS # BLD AUTO: 4.9 X10*3/UL (ref 1.6–5.5)
NEUTROPHILS NFR BLD AUTO: 65.4 %
NRBC BLD-RTO: 0 /100 WBCS (ref 0–0)
PLATELET # BLD AUTO: 172 X10*3/UL (ref 150–450)
POTASSIUM SERPL-SCNC: 4.5 MMOL/L (ref 3.5–5.3)
PROT SERPL-MCNC: 7.6 G/DL (ref 6.4–8.2)
PROTHROMBIN TIME: 13.1 SECONDS (ref 9.8–12.8)
RBC # BLD AUTO: 3.93 X10*6/UL (ref 4–5.2)
SODIUM SERPL-SCNC: 138 MMOL/L (ref 136–145)
WBC # BLD AUTO: 7.5 X10*3/UL (ref 4.4–11.3)

## 2024-09-16 PROCEDURE — 84075 ASSAY ALKALINE PHOSPHATASE: CPT | Performed by: STUDENT IN AN ORGANIZED HEALTH CARE EDUCATION/TRAINING PROGRAM

## 2024-09-16 PROCEDURE — 70450 CT HEAD/BRAIN W/O DYE: CPT

## 2024-09-16 PROCEDURE — 93005 ELECTROCARDIOGRAM TRACING: CPT

## 2024-09-16 PROCEDURE — 82947 ASSAY GLUCOSE BLOOD QUANT: CPT

## 2024-09-16 PROCEDURE — 85610 PROTHROMBIN TIME: CPT | Performed by: STUDENT IN AN ORGANIZED HEALTH CARE EDUCATION/TRAINING PROGRAM

## 2024-09-16 PROCEDURE — 36415 COLL VENOUS BLD VENIPUNCTURE: CPT | Performed by: STUDENT IN AN ORGANIZED HEALTH CARE EDUCATION/TRAINING PROGRAM

## 2024-09-16 PROCEDURE — 70450 CT HEAD/BRAIN W/O DYE: CPT | Performed by: STUDENT IN AN ORGANIZED HEALTH CARE EDUCATION/TRAINING PROGRAM

## 2024-09-16 PROCEDURE — 99285 EMERGENCY DEPT VISIT HI MDM: CPT | Mod: 25 | Performed by: STUDENT IN AN ORGANIZED HEALTH CARE EDUCATION/TRAINING PROGRAM

## 2024-09-16 PROCEDURE — 84484 ASSAY OF TROPONIN QUANT: CPT | Performed by: STUDENT IN AN ORGANIZED HEALTH CARE EDUCATION/TRAINING PROGRAM

## 2024-09-16 PROCEDURE — 85025 COMPLETE CBC W/AUTO DIFF WBC: CPT | Performed by: STUDENT IN AN ORGANIZED HEALTH CARE EDUCATION/TRAINING PROGRAM

## 2024-09-16 PROCEDURE — 85730 THROMBOPLASTIN TIME PARTIAL: CPT | Performed by: STUDENT IN AN ORGANIZED HEALTH CARE EDUCATION/TRAINING PROGRAM

## 2024-09-16 ASSESSMENT — LIFESTYLE VARIABLES
EVER HAD A DRINK FIRST THING IN THE MORNING TO STEADY YOUR NERVES TO GET RID OF A HANGOVER: NO
TOTAL SCORE: 0
HAVE YOU EVER FELT YOU SHOULD CUT DOWN ON YOUR DRINKING: NO
HAVE PEOPLE ANNOYED YOU BY CRITICIZING YOUR DRINKING: NO
EVER FELT BAD OR GUILTY ABOUT YOUR DRINKING: NO

## 2024-09-17 NOTE — ED PROVIDER NOTES
HPI   Chief Complaint   Patient presents with    stroke like symptoms       Patient is a 89-year-old female presenting from nursing facility after an unwitnessed fall.  Patient is not complaining of any pain at this time. Is on anticoagulation for atrial fibrillation.  EMS also noted some left-sided facial droop and mild dysarthria which appear new for her.  Last known well was earlier today around 7 PM.              Patient History   Past Medical History:   Diagnosis Date    Atherosclerotic heart disease of native coronary artery without angina pectoris 10/12/2017    Coronary sclerosis    Chronic systolic (congestive) heart failure (Multi) 04/18/2022    Chronic systolic heart failure, ACC/AHA stage C    Disorder of lacrimal system, unspecified 11/08/2018    Defect of lacrimal duct    Encounter for immunization 10/08/2020    Influenza vaccine administered    Fracture of unspecified part of neck of unspecified femur, initial encounter for closed fracture (Multi) 02/18/2021    Femoral neck fracture    Hypokalemia 08/01/2018    Low serum potassium    Other cardiomyopathies (Multi) 02/14/2022    NICM (nonischemic cardiomyopathy)    Other conditions influencing health status     Osteoarthritis    Other injury of unspecified body region, initial encounter 02/14/2020    Contusion of bone    Other specified abnormal findings of blood chemistry 08/12/2014    Azotemia    Other specified disorders of eye and adnexa 11/08/2018    Red eye    Other specified health status 09/28/2017    Influenza vaccination ordered    Pain in right hip 02/14/2020    Hip pain, right    Pain in unspecified foot 02/14/2014    Foot pain    Pain in unspecified hip 03/22/2018    Joint pain, hip    Pain in unspecified knee     Knee pain    Personal history of diseases of the skin and subcutaneous tissue 02/14/2014    History of ingrown nail    Personal history of other diseases of the circulatory system 07/09/2018    History of atrial fibrillation     Personal history of other diseases of the circulatory system 04/19/2018    History of congestive heart failure    Personal history of other diseases of the circulatory system 03/22/2018    History of sinus bradycardia    Personal history of other diseases of the circulatory system     History of hypertension    Personal history of other diseases of the circulatory system     History of peripheral vascular disease    Personal history of other diseases of the digestive system 05/23/2019    History of small bowel obstruction    Personal history of other diseases of the musculoskeletal system and connective tissue 06/29/2015    History of backache    Personal history of other diseases of the nervous system and sense organs 11/08/2018    History of conjunctivitis    Personal history of other diseases of the nervous system and sense organs 06/16/2016    History of hearing loss    Personal history of other diseases of the nervous system and sense organs 11/17/2016    History of tinnitus    Personal history of other diseases of the nervous system and sense organs 12/29/2017    History of redness of eye    Personal history of other endocrine, nutritional and metabolic disease 04/14/2016    History of hypokalemia    Personal history of other endocrine, nutritional and metabolic disease     History of hyperlipidemia    Personal history of other infectious and parasitic diseases     History of candidal vulvovaginitis    Personal history of other mental and behavioral disorders 08/16/2018    History of depression    Personal history of other specified conditions 09/13/2018    History of fatigue    Personal history of other specified conditions 07/10/2015    History of urinary frequency    Personal history of other specified conditions 03/13/2019    History of abdominal pain    Personal history of other specified conditions 03/15/2020    History of dyspnea    Personal history of other specified conditions 04/25/2022    History of  urinary urgency    Personal history of pneumonia (recurrent)     History of pneumonia    Personal history of urinary (tract) infections 11/12/2021    History of urinary tract infection    Personal history of urinary (tract) infections 07/02/2020    History of urinary tract infection    Pneumonitis due to inhalation of food and vomit (Multi)     Aspiration pneumonia of right lower lobe due to regurgitated food    Presence of unspecified artificial hip joint 10/22/2019    Status post hip replacement    Presence of unspecified artificial hip joint 03/22/2018    Status post hip replacement    Rash and other nonspecific skin eruption 11/05/2013    Rash    Retention of urine, unspecified 07/17/2015    Incomplete bladder emptying    Unilateral primary osteoarthritis, right knee     Osteoarthritis of right knee    Unspecified fall, initial encounter 02/15/2018    Fall, accidental     Past Surgical History:   Procedure Laterality Date    OTHER SURGICAL HISTORY  06/07/2013    Bypass Graft Using Vein: Femoral-popliteal    TOTAL ABDOMINAL HYSTERECTOMY W/ BILATERAL SALPINGOOPHORECTOMY  07/10/2015    Total Abdominal Hysterectomy With Removal Of Both Ovaries     No family history on file.  Social History     Tobacco Use    Smoking status: Never    Smokeless tobacco: Never   Substance Use Topics    Alcohol use: Not on file    Drug use: Not on file       Physical Exam   ED Triage Vitals [09/16/24 2100]   Temperature Heart Rate Respirations BP   36.6 °C (97.9 °F) 84 18 152/84      Pulse Ox Temp Source Heart Rate Source Patient Position   96 % Temporal -- --      BP Location FiO2 (%)     -- --       Physical Exam  Constitutional:       General: She is not in acute distress.     Appearance: She is not toxic-appearing.   Cardiovascular:      Rate and Rhythm: Normal rate. Rhythm irregular.   Pulmonary:      Breath sounds: Normal breath sounds. No wheezing, rhonchi or rales.   Abdominal:      Palpations: Abdomen is soft.      Tenderness:  There is no abdominal tenderness. There is no guarding or rebound.   Neurological:      Mental Status: She is alert.      Comments: NIH of 2 for left-sided facial droop and mild dysarthria.  Van negative.           ED Course & MDM   ED Course as of 09/16/24 2204   Mon Sep 16, 2024   2118 EKG interpreted as atrial fibrillation at a rate of 88 bpm, no ST elevations or depressions or T wave inversion stress of ischemia, QTc of 440 [AV]      ED Course User Index  [AV] Fidencio Lemus MD                 No data recorded             NIH Stroke Scale: 2 (09/16/24 2100 : Genet Phipps, BONNIE)                   Medical Decision Making  Patient is a 89-year-old female presenting for unwitnessed fall.  History physical examination was concerning for potential acute CVA versus head injury on anticoagulant.  The patient was activated as a brain attack prior to arrival.  CT head upon arrival demonstrated no acute mass or hemorrhage.  On repeat examination the patient adamantly wanted to go home.  She is on antiplatelet and anticoagulation with regards to the atrial fibrillation.  She was willing to wait for laboratory assessment and repeat neurologic assessment to further differentiate hospitalization versus back home to facility.  I did sign this repeat evaluation out to the oncoming attending.        Procedure  Procedures     Fidencio Lemus MD  09/16/24 2206

## 2024-09-17 NOTE — ED PROVIDER NOTES
Oncoming physician note from Dr. Gonzalez Love    I assumed care of the patient on 09/16/24 at 2200    I reviewed the chart, labs and imaging. I talked to the off going physician. I personally saw the patient and made/approved the management plan and take responsibility for the patient management.     Patient was worked up in the ED for unwitnessed fall at the facility.  She has a tick on the left eye according to her daughter.  Concerned that she may have had a stroke.  She denies any new deficits as does her daughter who knows her well.  She does not want to stay.  Plan is to attempt to ambulate and encouraged her to use a walker and if she is able to do that her daughter will take her back.    Nurse reports she is able to ambulate will be discharged back to the facility with her daughter.  Shared decision-making was employed.  Patient does not want be hospitalized.  ED Course as of 09/16/24 2219   Mon Sep 16, 2024   2118 EKG interpreted as atrial fibrillation at a rate of 88 bpm, no ST elevations or depressions or T wave inversion stress of ischemia, QTc of 440 [AV]      ED Course User Index  [AV] Fidencio Lemus MD         Diagnoses as of 09/16/24 2219   Fall, initial encounter        Gonzalez Love MD  09/16/24 2227

## 2024-09-18 ENCOUNTER — HOME VISIT (OUTPATIENT)
Dept: POST ACUTE CARE | Facility: EXTERNAL LOCATION | Age: 89
End: 2024-09-18
Payer: MEDICARE

## 2024-09-18 DIAGNOSIS — R26.9 ABNORMAL GAIT: ICD-10-CM

## 2024-09-18 DIAGNOSIS — M62.81 MUSCLE WEAKNESS: ICD-10-CM

## 2024-09-18 DIAGNOSIS — I48.20 CHRONIC ATRIAL FIBRILLATION (MULTI): ICD-10-CM

## 2024-09-18 DIAGNOSIS — I50.20 HFREF (HEART FAILURE WITH REDUCED EJECTION FRACTION): Primary | ICD-10-CM

## 2024-09-18 PROCEDURE — 99349 HOME/RES VST EST MOD MDM 40: CPT | Performed by: FAMILY MEDICINE

## 2024-09-19 VITALS — DIASTOLIC BLOOD PRESSURE: 70 MMHG | HEART RATE: 90 BPM | SYSTOLIC BLOOD PRESSURE: 130 MMHG | RESPIRATION RATE: 18 BRPM

## 2024-09-19 ASSESSMENT — ENCOUNTER SYMPTOMS
APPETITE CHANGE: 0
NAUSEA: 0
UNEXPECTED WEIGHT CHANGE: 0

## 2024-09-19 NOTE — PROGRESS NOTES
Subjective   Patient ID: Constance Apodaca is a 89 y.o. female who is assisted living/ home patient being seen and evaluated for multiple medical problems.    HPI   Patient noted to have weakness with decreased balance and decreased safety  Abnormal gait    Review of Systems   Constitutional:  Negative for appetite change and unexpected weight change.   Eyes:  Negative for visual disturbance.   Gastrointestinal:  Negative for nausea.       Objective   /70   Pulse 90   Resp 18     Physical Exam  Constitutional:       General: She is not in acute distress.     Appearance: Normal appearance.   HENT:      Head: Normocephalic and atraumatic.   Eyes:      Conjunctiva/sclera: Conjunctivae normal.   Cardiovascular:      Rate and Rhythm: Normal rate and regular rhythm.   Pulmonary:      Effort: Pulmonary effort is normal.      Breath sounds: Normal breath sounds.   Abdominal:      Palpations: Abdomen is soft.   Musculoskeletal:      Cervical back: Neck supple.   Lymphadenopathy:      Cervical: No cervical adenopathy.   Neurological:      Mental Status: She is alert.         Assessment/Plan   Problem List Items Addressed This Visit             ICD-10-CM    Chronic atrial fibrillation (Multi) I48.20     Rate controlled         HFrEF (heart failure with reduced ejection fraction) (Multi) - Primary I50.20     Treated and controlled          Other Visit Diagnoses         Codes    Abnormal gait     R26.9    Muscle weakness     M62.81          Physical therapy eval and treat for gait strength safety and balance  Reviewed labs  Recheck 3 months sooner if any issues arise

## 2024-09-20 LAB
Q ONSET: 220 MS
QRS COUNT: 14 BEATS
QRS DURATION: 128 MS
QT INTERVAL: 364 MS
QTC CALCULATION(BAZETT): 440 MS
QTC FREDERICIA: 413 MS
R AXIS: 53 DEGREES
T AXIS: -43 DEGREES
T OFFSET: 402 MS
VENTRICULAR RATE: 88 BPM

## 2024-10-05 ENCOUNTER — LAB REQUISITION (OUTPATIENT)
Dept: LAB | Facility: HOSPITAL | Age: 89
End: 2024-10-05
Payer: MEDICARE

## 2024-10-05 DIAGNOSIS — R41.82 ALTERED MENTAL STATUS, UNSPECIFIED: ICD-10-CM

## 2024-10-05 LAB
APPEARANCE UR: ABNORMAL
BILIRUB UR STRIP.AUTO-MCNC: NEGATIVE MG/DL
COLOR UR: ABNORMAL
GLUCOSE UR STRIP.AUTO-MCNC: NORMAL MG/DL
KETONES UR STRIP.AUTO-MCNC: NEGATIVE MG/DL
LEUKOCYTE ESTERASE UR QL STRIP.AUTO: ABNORMAL
NITRITE UR QL STRIP.AUTO: NEGATIVE
PH UR STRIP.AUTO: 6 [PH]
PROT UR STRIP.AUTO-MCNC: ABNORMAL MG/DL
RBC # UR STRIP.AUTO: ABNORMAL /UL
RBC #/AREA URNS AUTO: ABNORMAL /HPF
SP GR UR STRIP.AUTO: 1.01
SQUAMOUS #/AREA URNS AUTO: ABNORMAL /HPF
UROBILINOGEN UR STRIP.AUTO-MCNC: NORMAL MG/DL
WBC #/AREA URNS AUTO: >50 /HPF
WBC CLUMPS #/AREA URNS AUTO: ABNORMAL /HPF

## 2024-10-05 PROCEDURE — 87086 URINE CULTURE/COLONY COUNT: CPT | Mod: OUT,GEALAB | Performed by: FAMILY MEDICINE

## 2024-10-05 PROCEDURE — 81001 URINALYSIS AUTO W/SCOPE: CPT | Mod: OUT | Performed by: FAMILY MEDICINE

## 2024-10-07 LAB — BACTERIA UR CULT: NORMAL

## 2025-01-15 ENCOUNTER — HOME VISIT (OUTPATIENT)
Dept: POST ACUTE CARE | Facility: EXTERNAL LOCATION | Age: OVER 89
End: 2025-01-15
Payer: MEDICARE

## 2025-01-15 DIAGNOSIS — F02.A0 MILD LATE ONSET ALZHEIMER'S DEMENTIA WITHOUT BEHAVIORAL DISTURBANCE, PSYCHOTIC DISTURBANCE, MOOD DISTURBANCE, OR ANXIETY (MULTI): ICD-10-CM

## 2025-01-15 DIAGNOSIS — M15.0 PRIMARY GENERALIZED HYPERTROPHIC OSTEOARTHROSIS: ICD-10-CM

## 2025-01-15 DIAGNOSIS — G91.2 NPH (NORMAL PRESSURE HYDROCEPHALUS) (MULTI): ICD-10-CM

## 2025-01-15 DIAGNOSIS — Z00.00 ROUTINE GENERAL MEDICAL EXAMINATION AT HEALTH CARE FACILITY: Primary | ICD-10-CM

## 2025-01-15 DIAGNOSIS — G30.1 MILD LATE ONSET ALZHEIMER'S DEMENTIA WITHOUT BEHAVIORAL DISTURBANCE, PSYCHOTIC DISTURBANCE, MOOD DISTURBANCE, OR ANXIETY (MULTI): ICD-10-CM

## 2025-01-15 DIAGNOSIS — I48.20 CHRONIC ATRIAL FIBRILLATION (MULTI): ICD-10-CM

## 2025-01-15 PROCEDURE — G0439 PPPS, SUBSEQ VISIT: HCPCS | Performed by: FAMILY MEDICINE

## 2025-01-15 PROCEDURE — 99349 HOME/RES VST EST MOD MDM 40: CPT | Performed by: FAMILY MEDICINE

## 2025-01-16 VITALS
HEIGHT: 55 IN | BODY MASS INDEX: 25.27 KG/M2 | RESPIRATION RATE: 18 BRPM | HEART RATE: 72 BPM | DIASTOLIC BLOOD PRESSURE: 76 MMHG | SYSTOLIC BLOOD PRESSURE: 113 MMHG | TEMPERATURE: 97.7 F | WEIGHT: 109.2 LBS

## 2025-01-16 ASSESSMENT — PATIENT HEALTH QUESTIONNAIRE - PHQ9
SUM OF ALL RESPONSES TO PHQ9 QUESTIONS 1 AND 2: 0
1. LITTLE INTEREST OR PLEASURE IN DOING THINGS: NOT AT ALL
2. FEELING DOWN, DEPRESSED OR HOPELESS: NOT AT ALL

## 2025-01-16 ASSESSMENT — ENCOUNTER SYMPTOMS
UNEXPECTED WEIGHT CHANGE: 0
NAUSEA: 0
APPETITE CHANGE: 0

## 2025-01-16 ASSESSMENT — ACTIVITIES OF DAILY LIVING (ADL)
MANAGING_FINANCES: TOTAL CARE
DOING_HOUSEWORK: TOTAL CARE
BATHING: NEEDS ASSISTANCE
DRESSING: NEEDS ASSISTANCE
GROCERY_SHOPPING: TOTAL CARE
TAKING_MEDICATION: TOTAL CARE

## 2025-01-16 NOTE — PROGRESS NOTES
"Subjective   Patient ID: Constance Apodaca is a 89 y.o. female who is assisted living/ home patient being seen and evaluated for multiple medical problems.    HPI   Patient is seen for Medicare wellness visit  Patient was ambulating throughout the facility with front wheel walker but recently using wheelchair for mobility patient services to increase strength and balance and to increase confidence to ambulate and decrease risk of functional decline    Review of Systems   Constitutional:  Negative for appetite change and unexpected weight change.   Eyes:  Negative for visual disturbance.   Gastrointestinal:  Negative for nausea.       Objective   /76   Pulse 72   Temp 36.5 °C (97.7 °F)   Resp 18   Ht 1.346 m (4' 5\")   Wt 49.5 kg (109 lb 3.2 oz)   BMI 27.33 kg/m²     Physical Exam  Constitutional:       General: She is not in acute distress.  HENT:      Head: Normocephalic and atraumatic.   Eyes:      Pupils: Pupils are equal, round, and reactive to light.   Cardiovascular:      Rate and Rhythm: Normal rate and regular rhythm.   Pulmonary:      Effort: Pulmonary effort is normal.      Breath sounds: Normal breath sounds.   Abdominal:      Palpations: Abdomen is soft.   Neurological:      General: No focal deficit present.      Mental Status: She is alert.         Assessment/Plan   Problem List Items Addressed This Visit             ICD-10-CM    Chronic atrial fibrillation (Multi) I48.20     Rate controlled         Mild late onset Alzheimer's dementia without behavioral disturbance, psychotic disturbance, mood disturbance, or anxiety (Multi) G30.1, F02.A0     Patient cared for in 24-hour nursing facility memory care assisted living         NPH (normal pressure hydrocephalus) (Multi) G91.2     Monitor          Other Visit Diagnoses         Codes    Routine general medical examination at health care facility    -  Primary Z00.00    Relevant Orders    1 Year Follow Up In Primary Care - Wellness Exam    Primary " generalized hypertrophic osteoarthrosis     M15.0        HM LM discussed  Physical therapy eval and treat for gait strength and safety  Reviewed labs done in September 2024  Speech therapy eval and treat completed for difficulty with chewing eye December 18, 2024  Recheck 3 months sooner if any issues arise

## 2025-04-03 ENCOUNTER — LAB REQUISITION (OUTPATIENT)
Dept: LAB | Facility: HOSPITAL | Age: OVER 89
End: 2025-04-03
Payer: MEDICARE

## 2025-04-03 DIAGNOSIS — R94.6 ABNORMAL RESULTS OF THYROID FUNCTION STUDIES: ICD-10-CM

## 2025-04-03 DIAGNOSIS — Z13.220 ENCOUNTER FOR SCREENING FOR LIPOID DISORDERS: ICD-10-CM

## 2025-04-03 DIAGNOSIS — Z13.0 ENCOUNTER FOR SCREENING FOR DISEASES OF THE BLOOD AND BLOOD-FORMING ORGANS AND CERTAIN DISORDERS INVOLVING THE IMMUNE MECHANISM: ICD-10-CM

## 2025-04-03 DIAGNOSIS — D47.1 CHRONIC MYELOPROLIFERATIVE DISEASE (MULTI): ICD-10-CM

## 2025-04-03 LAB
ALBUMIN SERPL BCP-MCNC: 3.7 G/DL (ref 3.4–5)
ALP SERPL-CCNC: 49 U/L (ref 33–136)
ALT SERPL W P-5'-P-CCNC: 8 U/L (ref 7–45)
ANION GAP SERPL CALC-SCNC: 13 MMOL/L (ref 10–20)
AST SERPL W P-5'-P-CCNC: 16 U/L (ref 9–39)
BILIRUB SERPL-MCNC: 0.8 MG/DL (ref 0–1.2)
BUN SERPL-MCNC: 25 MG/DL (ref 6–23)
CALCIUM SERPL-MCNC: 8.6 MG/DL (ref 8.6–10.3)
CHLORIDE SERPL-SCNC: 101 MMOL/L (ref 98–107)
CHOLEST SERPL-MCNC: 103 MG/DL (ref 0–199)
CHOLESTEROL/HDL RATIO: 3.6
CO2 SERPL-SCNC: 26 MMOL/L (ref 21–32)
CREAT SERPL-MCNC: 1.18 MG/DL (ref 0.5–1.05)
EGFRCR SERPLBLD CKD-EPI 2021: 42 ML/MIN/1.73M*2
ERYTHROCYTE [DISTWIDTH] IN BLOOD BY AUTOMATED COUNT: 13.2 % (ref 11.5–14.5)
GLUCOSE SERPL-MCNC: 108 MG/DL (ref 74–99)
HCT VFR BLD AUTO: 38 % (ref 36–46)
HDLC SERPL-MCNC: 28.6 MG/DL
HGB BLD-MCNC: 12 G/DL (ref 12–16)
MCH RBC QN AUTO: 31.7 PG (ref 26–34)
MCHC RBC AUTO-ENTMCNC: 31.6 G/DL (ref 32–36)
MCV RBC AUTO: 101 FL (ref 80–100)
NON-HDL CHOLESTEROL: 74 MG/DL (ref 0–149)
NRBC BLD-RTO: 0 /100 WBCS (ref 0–0)
PLATELET # BLD AUTO: 201 X10*3/UL (ref 150–450)
POTASSIUM SERPL-SCNC: 4.3 MMOL/L (ref 3.5–5.3)
PROT SERPL-MCNC: 6.2 G/DL (ref 6.4–8.2)
RBC # BLD AUTO: 3.78 X10*6/UL (ref 4–5.2)
SODIUM SERPL-SCNC: 136 MMOL/L (ref 136–145)
TSH SERPL-ACNC: 1.84 MIU/L (ref 0.44–3.98)
WBC # BLD AUTO: 7.7 X10*3/UL (ref 4.4–11.3)

## 2025-04-03 PROCEDURE — 83718 ASSAY OF LIPOPROTEIN: CPT | Mod: OUT | Performed by: FAMILY MEDICINE

## 2025-04-03 PROCEDURE — 36415 COLL VENOUS BLD VENIPUNCTURE: CPT | Mod: OUT | Performed by: FAMILY MEDICINE

## 2025-04-03 PROCEDURE — 84443 ASSAY THYROID STIM HORMONE: CPT | Mod: OUT | Performed by: FAMILY MEDICINE

## 2025-04-03 PROCEDURE — 85027 COMPLETE CBC AUTOMATED: CPT | Mod: OUT | Performed by: FAMILY MEDICINE

## 2025-04-03 PROCEDURE — 80053 COMPREHEN METABOLIC PANEL: CPT | Mod: OUT | Performed by: FAMILY MEDICINE

## 2025-04-16 ENCOUNTER — HOME VISIT (OUTPATIENT)
Dept: POST ACUTE CARE | Facility: EXTERNAL LOCATION | Age: OVER 89
End: 2025-04-16
Payer: MEDICARE

## 2025-04-16 VITALS
BODY MASS INDEX: 26.83 KG/M2 | WEIGHT: 107.2 LBS | HEART RATE: 72 BPM | SYSTOLIC BLOOD PRESSURE: 111 MMHG | DIASTOLIC BLOOD PRESSURE: 62 MMHG | RESPIRATION RATE: 18 BRPM

## 2025-04-16 DIAGNOSIS — I48.20 CHRONIC ATRIAL FIBRILLATION (MULTI): Primary | ICD-10-CM

## 2025-04-16 DIAGNOSIS — G30.1 MILD LATE ONSET ALZHEIMER'S DEMENTIA WITHOUT BEHAVIORAL DISTURBANCE, PSYCHOTIC DISTURBANCE, MOOD DISTURBANCE, OR ANXIETY (MULTI): ICD-10-CM

## 2025-04-16 DIAGNOSIS — F02.A0 MILD LATE ONSET ALZHEIMER'S DEMENTIA WITHOUT BEHAVIORAL DISTURBANCE, PSYCHOTIC DISTURBANCE, MOOD DISTURBANCE, OR ANXIETY (MULTI): ICD-10-CM

## 2025-04-16 PROBLEM — J18.9 PNEUMONIA OF LOWER LOBE DUE TO INFECTIOUS ORGANISM, UNSPECIFIED LATERALITY: Status: RESOLVED | Noted: 2024-05-30 | Resolved: 2025-04-16

## 2025-04-16 PROCEDURE — 99349 HOME/RES VST EST MOD MDM 40: CPT | Performed by: FAMILY MEDICINE

## 2025-04-16 ASSESSMENT — ENCOUNTER SYMPTOMS
APPETITE CHANGE: 0
NAUSEA: 0
UNEXPECTED WEIGHT CHANGE: 0

## 2025-04-16 NOTE — PROGRESS NOTES
Subjective   Patient ID: Constance Apodaca is a 89 y.o. female who is assisted living/ home patient being seen and evaluated for multiple medical problems.    HPI   Patient is seen for routine follow-up  She has been having a series of dental appointments    Review of Systems   Constitutional:  Negative for appetite change and unexpected weight change.   Eyes:  Negative for visual disturbance.   Gastrointestinal:  Negative for nausea.       Objective   /62   Pulse 72   Resp 18   Wt 48.6 kg (107 lb 3.2 oz)   BMI 26.83 kg/m²     Physical Exam  Constitutional:       General: She is not in acute distress.     Appearance: Normal appearance.   HENT:      Head: Normocephalic and atraumatic.   Eyes:      Conjunctiva/sclera: Conjunctivae normal.   Cardiovascular:      Rate and Rhythm: Normal rate and regular rhythm.   Pulmonary:      Effort: Pulmonary effort is normal.      Breath sounds: Normal breath sounds.   Abdominal:      Palpations: Abdomen is soft.   Musculoskeletal:      Cervical back: Neck supple.   Lymphadenopathy:      Cervical: No cervical adenopathy.   Neurological:      Mental Status: She is alert.         Assessment/Plan   Problem List Items Addressed This Visit           ICD-10-CM    Chronic atrial fibrillation (Multi) - Primary I48.20    Rate controlled  For dental visits hold Eliquis and give amoxicillin prior to dental appointment tooth extractions         Mild late onset Alzheimer's dementia without behavioral disturbance, psychotic disturbance, mood disturbance, or anxiety (Multi) G30.1, F02.A0    Stable in assisted living with 24-hour care            Check labs and reviewed labs  Recheck 3 months sooner if any issues arise

## 2025-04-16 NOTE — ASSESSMENT & PLAN NOTE
Rate controlled  For dental visits hold Eliquis and give amoxicillin prior to dental appointment tooth extractions

## 2025-07-02 ENCOUNTER — HOME VISIT (OUTPATIENT)
Dept: POST ACUTE CARE | Facility: EXTERNAL LOCATION | Age: OVER 89
End: 2025-07-02
Payer: MEDICARE

## 2025-07-02 VITALS
WEIGHT: 111 LBS | TEMPERATURE: 97.8 F | DIASTOLIC BLOOD PRESSURE: 75 MMHG | HEART RATE: 94 BPM | RESPIRATION RATE: 18 BRPM | SYSTOLIC BLOOD PRESSURE: 142 MMHG | BODY MASS INDEX: 27.78 KG/M2

## 2025-07-02 DIAGNOSIS — G91.2 NPH (NORMAL PRESSURE HYDROCEPHALUS) (MULTI): ICD-10-CM

## 2025-07-02 DIAGNOSIS — I48.20 CHRONIC ATRIAL FIBRILLATION (MULTI): ICD-10-CM

## 2025-07-02 DIAGNOSIS — F02.A0 MILD LATE ONSET ALZHEIMER'S DEMENTIA WITHOUT BEHAVIORAL DISTURBANCE, PSYCHOTIC DISTURBANCE, MOOD DISTURBANCE, OR ANXIETY (MULTI): ICD-10-CM

## 2025-07-02 DIAGNOSIS — I50.20 HFREF (HEART FAILURE WITH REDUCED EJECTION FRACTION): Primary | ICD-10-CM

## 2025-07-02 DIAGNOSIS — G30.1 MILD LATE ONSET ALZHEIMER'S DEMENTIA WITHOUT BEHAVIORAL DISTURBANCE, PSYCHOTIC DISTURBANCE, MOOD DISTURBANCE, OR ANXIETY (MULTI): ICD-10-CM

## 2025-07-02 PROCEDURE — 99349 HOME/RES VST EST MOD MDM 40: CPT | Performed by: FAMILY MEDICINE

## 2025-07-02 ASSESSMENT — ENCOUNTER SYMPTOMS
UNEXPECTED WEIGHT CHANGE: 0
APPETITE CHANGE: 0
NAUSEA: 0

## 2025-07-02 NOTE — PROGRESS NOTES
Subjective   Patient ID: Constance Apodaca is a 89 y.o. female who is assisted living/ home patient being seen and evaluated for multiple medical problems.    HPI   Stable per nursing  No complaints of today    Review of Systems   Constitutional:  Negative for appetite change and unexpected weight change.   Eyes:  Negative for visual disturbance.   Gastrointestinal:  Negative for nausea.       Objective   /75   Pulse 94   Temp 36.6 °C (97.8 °F)   Resp 18   Wt 50.3 kg (111 lb)   BMI 27.78 kg/m²     Physical Exam  Constitutional:       General: She is not in acute distress.     Appearance: Normal appearance.   HENT:      Head: Normocephalic and atraumatic.   Eyes:      Conjunctiva/sclera: Conjunctivae normal.   Cardiovascular:      Rate and Rhythm: Normal rate and regular rhythm.   Pulmonary:      Effort: Pulmonary effort is normal.      Breath sounds: Normal breath sounds.   Abdominal:      Palpations: Abdomen is soft.   Musculoskeletal:      Cervical back: Neck supple.   Lymphadenopathy:      Cervical: No cervical adenopathy.   Neurological:      Mental Status: She is alert.         Assessment/Plan   Problem List Items Addressed This Visit           ICD-10-CM    Chronic atrial fibrillation (Multi) I48.20    Rate controlled         HFrEF (heart failure with reduced ejection fraction) - Primary I50.20    Monitor         Mild late onset Alzheimer's dementia without behavioral disturbance, psychotic disturbance, mood disturbance, or anxiety (Multi) G30.1, F02.A0    NPH (normal pressure hydrocephalus) (Multi) G91.2   Reviewed labs from April 3, 2025  Continue current medications and therapies  Recheck 3 months sooner if any issues arise